# Patient Record
Sex: FEMALE | Race: BLACK OR AFRICAN AMERICAN | NOT HISPANIC OR LATINO | Employment: OTHER | URBAN - METROPOLITAN AREA
[De-identification: names, ages, dates, MRNs, and addresses within clinical notes are randomized per-mention and may not be internally consistent; named-entity substitution may affect disease eponyms.]

---

## 2023-11-19 ENCOUNTER — HOSPITAL ENCOUNTER (OUTPATIENT)
Facility: HOSPITAL | Age: 73
Setting detail: OBSERVATION
Discharge: LEFT BEFORE DISCHARGE | End: 2023-11-20
Attending: EMERGENCY MEDICINE | Admitting: NURSE PRACTITIONER
Payer: COMMERCIAL

## 2023-11-19 ENCOUNTER — APPOINTMENT (OUTPATIENT)
Dept: RADIOLOGY | Facility: HOSPITAL | Age: 73
End: 2023-11-19
Payer: COMMERCIAL

## 2023-11-19 DIAGNOSIS — J44.1 COPD EXACERBATION (MULTI): Primary | ICD-10-CM

## 2023-11-19 LAB
ALBUMIN SERPL BCP-MCNC: 3.8 G/DL (ref 3.4–5)
ALP SERPL-CCNC: 122 U/L (ref 33–136)
ALT SERPL W P-5'-P-CCNC: 8 U/L (ref 7–45)
ANION GAP BLDV CALCULATED.4IONS-SCNC: 13 MMOL/L (ref 10–25)
ANION GAP SERPL CALC-SCNC: 17 MMOL/L (ref 10–20)
AST SERPL W P-5'-P-CCNC: 19 U/L (ref 9–39)
BASE EXCESS BLDV CALC-SCNC: 3.7 MMOL/L (ref -2–3)
BASOPHILS # BLD AUTO: 0.05 X10*3/UL (ref 0–0.1)
BASOPHILS NFR BLD AUTO: 0.5 %
BILIRUB SERPL-MCNC: 0.4 MG/DL (ref 0–1.2)
BODY TEMPERATURE: 37 DEGREES CELSIUS
BUN SERPL-MCNC: 16 MG/DL (ref 6–23)
CA-I BLDV-SCNC: 1.17 MMOL/L (ref 1.1–1.33)
CALCIUM SERPL-MCNC: 9.5 MG/DL (ref 8.6–10.6)
CARDIAC TROPONIN I PNL SERPL HS: 15 NG/L (ref 0–34)
CHLORIDE BLDV-SCNC: 100 MMOL/L (ref 98–107)
CHLORIDE SERPL-SCNC: 102 MMOL/L (ref 98–107)
CO2 SERPL-SCNC: 25 MMOL/L (ref 21–32)
CREAT SERPL-MCNC: 0.48 MG/DL (ref 0.5–1.05)
EOSINOPHIL # BLD AUTO: 0.45 X10*3/UL (ref 0–0.4)
EOSINOPHIL NFR BLD AUTO: 4.6 %
ERYTHROCYTE [DISTWIDTH] IN BLOOD BY AUTOMATED COUNT: 14.6 % (ref 11.5–14.5)
FLUAV RNA RESP QL NAA+PROBE: NOT DETECTED
FLUBV RNA RESP QL NAA+PROBE: NOT DETECTED
GFR SERPL CREATININE-BSD FRML MDRD: >90 ML/MIN/1.73M*2
GLUCOSE BLDV-MCNC: 106 MG/DL (ref 74–99)
GLUCOSE SERPL-MCNC: 96 MG/DL (ref 74–99)
HCO3 BLDV-SCNC: 27.7 MMOL/L (ref 22–26)
HCT VFR BLD AUTO: 46.8 % (ref 36–46)
HCT VFR BLD EST: 48 % (ref 36–46)
HGB BLD-MCNC: 15.2 G/DL (ref 12–16)
HGB BLDV-MCNC: 15.9 G/DL (ref 12–16)
IMM GRANULOCYTES # BLD AUTO: 0.03 X10*3/UL (ref 0–0.5)
IMM GRANULOCYTES NFR BLD AUTO: 0.3 % (ref 0–0.9)
LACTATE BLDV-SCNC: 1.1 MMOL/L (ref 0.4–2)
LYMPHOCYTES # BLD AUTO: 1.43 X10*3/UL (ref 0.8–3)
LYMPHOCYTES NFR BLD AUTO: 14.8 %
MCH RBC QN AUTO: 29.2 PG (ref 26–34)
MCHC RBC AUTO-ENTMCNC: 32.5 G/DL (ref 32–36)
MCV RBC AUTO: 90 FL (ref 80–100)
MONOCYTES # BLD AUTO: 0.84 X10*3/UL (ref 0.05–0.8)
MONOCYTES NFR BLD AUTO: 8.7 %
NEUTROPHILS # BLD AUTO: 6.88 X10*3/UL (ref 1.6–5.5)
NEUTROPHILS NFR BLD AUTO: 71.1 %
NRBC BLD-RTO: 0 /100 WBCS (ref 0–0)
OXYHGB MFR BLDV: 88.2 % (ref 45–75)
PCO2 BLDV: 39 MM HG (ref 41–51)
PH BLDV: 7.46 PH (ref 7.33–7.43)
PLATELET # BLD AUTO: 309 X10*3/UL (ref 150–450)
PO2 BLDV: 63 MM HG (ref 35–45)
POTASSIUM BLDV-SCNC: 4.1 MMOL/L (ref 3.5–5.3)
POTASSIUM SERPL-SCNC: 3.9 MMOL/L (ref 3.5–5.3)
PROT SERPL-MCNC: 7.2 G/DL (ref 6.4–8.2)
RBC # BLD AUTO: 5.21 X10*6/UL (ref 4–5.2)
SAO2 % BLDV: 91 % (ref 45–75)
SARS-COV-2 RNA RESP QL NAA+PROBE: NOT DETECTED
SODIUM BLDV-SCNC: 137 MMOL/L (ref 136–145)
SODIUM SERPL-SCNC: 140 MMOL/L (ref 136–145)
WBC # BLD AUTO: 9.7 X10*3/UL (ref 4.4–11.3)

## 2023-11-19 PROCEDURE — 83880 ASSAY OF NATRIURETIC PEPTIDE: CPT | Performed by: NURSE PRACTITIONER

## 2023-11-19 PROCEDURE — 84484 ASSAY OF TROPONIN QUANT: CPT | Performed by: EMERGENCY MEDICINE

## 2023-11-19 PROCEDURE — 85652 RBC SED RATE AUTOMATED: CPT | Performed by: NURSE PRACTITIONER

## 2023-11-19 PROCEDURE — 93010 ELECTROCARDIOGRAM REPORT: CPT | Performed by: EMERGENCY MEDICINE

## 2023-11-19 PROCEDURE — 71045 X-RAY EXAM CHEST 1 VIEW: CPT | Mod: FY

## 2023-11-19 PROCEDURE — 82330 ASSAY OF CALCIUM: CPT

## 2023-11-19 PROCEDURE — 93005 ELECTROCARDIOGRAM TRACING: CPT

## 2023-11-19 PROCEDURE — 86140 C-REACTIVE PROTEIN: CPT | Performed by: NURSE PRACTITIONER

## 2023-11-19 PROCEDURE — 94640 AIRWAY INHALATION TREATMENT: CPT

## 2023-11-19 PROCEDURE — 85025 COMPLETE CBC W/AUTO DIFF WBC: CPT | Performed by: EMERGENCY MEDICINE

## 2023-11-19 PROCEDURE — 82435 ASSAY OF BLOOD CHLORIDE: CPT | Performed by: EMERGENCY MEDICINE

## 2023-11-19 PROCEDURE — 99285 EMERGENCY DEPT VISIT HI MDM: CPT | Mod: 25 | Performed by: EMERGENCY MEDICINE

## 2023-11-19 PROCEDURE — 99285 EMERGENCY DEPT VISIT HI MDM: CPT | Performed by: EMERGENCY MEDICINE

## 2023-11-19 PROCEDURE — 71045 X-RAY EXAM CHEST 1 VIEW: CPT | Performed by: RADIOLOGY

## 2023-11-19 PROCEDURE — 36415 COLL VENOUS BLD VENIPUNCTURE: CPT | Performed by: EMERGENCY MEDICINE

## 2023-11-19 PROCEDURE — 87636 SARSCOV2 & INF A&B AMP PRB: CPT | Performed by: EMERGENCY MEDICINE

## 2023-11-19 PROCEDURE — 83605 ASSAY OF LACTIC ACID: CPT

## 2023-11-19 PROCEDURE — 2500000002 HC RX 250 W HCPCS SELF ADMINISTERED DRUGS (ALT 637 FOR MEDICARE OP, ALT 636 FOR OP/ED)

## 2023-11-19 RX ORDER — FLUTICASONE PROPIONATE 50 MCG
1 SPRAY, SUSPENSION (ML) NASAL ONCE
Status: COMPLETED | OUTPATIENT
Start: 2023-11-19 | End: 2023-11-19

## 2023-11-19 RX ORDER — ACETAMINOPHEN 325 MG/1
650 TABLET ORAL ONCE
Status: COMPLETED | OUTPATIENT
Start: 2023-11-19 | End: 2023-11-19

## 2023-11-19 RX ORDER — FLUTICASONE PROPIONATE 50 MCG
2 SPRAY, SUSPENSION (ML) NASAL DAILY
Status: DISCONTINUED | OUTPATIENT
Start: 2023-11-20 | End: 2023-11-19

## 2023-11-19 RX ORDER — IPRATROPIUM BROMIDE AND ALBUTEROL SULFATE 2.5; .5 MG/3ML; MG/3ML
3 SOLUTION RESPIRATORY (INHALATION)
Status: DISCONTINUED | OUTPATIENT
Start: 2023-11-19 | End: 2023-11-19

## 2023-11-19 RX ORDER — IPRATROPIUM BROMIDE AND ALBUTEROL SULFATE 2.5; .5 MG/3ML; MG/3ML
3 SOLUTION RESPIRATORY (INHALATION)
Status: DISCONTINUED | OUTPATIENT
Start: 2023-11-19 | End: 2023-11-20

## 2023-11-19 RX ADMIN — FLUTICASONE PROPIONATE 1 SPRAY: 50 SPRAY, METERED NASAL at 23:45

## 2023-11-19 RX ADMIN — IPRATROPIUM BROMIDE AND ALBUTEROL SULFATE 3 ML: .5; 3 SOLUTION RESPIRATORY (INHALATION) at 20:20

## 2023-11-19 RX ADMIN — IPRATROPIUM BROMIDE AND ALBUTEROL SULFATE 3 ML: .5; 3 SOLUTION RESPIRATORY (INHALATION) at 22:17

## 2023-11-19 RX ADMIN — ACETAMINOPHEN 650 MG: 325 TABLET ORAL at 23:45

## 2023-11-19 ASSESSMENT — COLUMBIA-SUICIDE SEVERITY RATING SCALE - C-SSRS
2. HAVE YOU ACTUALLY HAD ANY THOUGHTS OF KILLING YOURSELF?: NO
1. IN THE PAST MONTH, HAVE YOU WISHED YOU WERE DEAD OR WISHED YOU COULD GO TO SLEEP AND NOT WAKE UP?: NO
6. HAVE YOU EVER DONE ANYTHING, STARTED TO DO ANYTHING, OR PREPARED TO DO ANYTHING TO END YOUR LIFE?: NO

## 2023-11-20 ENCOUNTER — PHARMACY VISIT (OUTPATIENT)
Dept: PHARMACY | Facility: CLINIC | Age: 73
End: 2023-11-20
Payer: MEDICARE

## 2023-11-20 VITALS
DIASTOLIC BLOOD PRESSURE: 82 MMHG | WEIGHT: 140 LBS | HEIGHT: 64 IN | BODY MASS INDEX: 23.9 KG/M2 | RESPIRATION RATE: 20 BRPM | TEMPERATURE: 98.4 F | OXYGEN SATURATION: 91 % | HEART RATE: 78 BPM | SYSTOLIC BLOOD PRESSURE: 120 MMHG

## 2023-11-20 PROBLEM — M81.0 OSTEOPOROSIS: Status: ACTIVE | Noted: 2023-11-20

## 2023-11-20 PROBLEM — J44.1 COPD EXACERBATION (MULTI): Status: ACTIVE | Noted: 2023-11-20

## 2023-11-20 PROBLEM — I26.99 OTHER PULMONARY EMBOLISM WITHOUT ACUTE COR PULMONALE (MULTI): Status: ACTIVE | Noted: 2021-12-07

## 2023-11-20 PROBLEM — M48.02 STENOSIS, CERVICAL SPINE: Status: ACTIVE | Noted: 2023-11-20

## 2023-11-20 PROBLEM — J84.10 PULMONARY FIBROSIS, UNSPECIFIED (MULTI): Status: ACTIVE | Noted: 2021-12-07

## 2023-11-20 PROBLEM — I10 ESSENTIAL (PRIMARY) HYPERTENSION: Status: ACTIVE | Noted: 2021-12-07

## 2023-11-20 PROBLEM — M51.06 INTERVERTEBRAL DISC DISORDERS WITH MYELOPATHY, LUMBAR REGION: Status: ACTIVE | Noted: 2021-12-07

## 2023-11-20 PROBLEM — M17.12 PRIMARY OSTEOARTHRITIS OF LEFT KNEE: Status: ACTIVE | Noted: 2023-11-20

## 2023-11-20 PROBLEM — M54.16 LUMBAR RADICULOPATHY: Status: ACTIVE | Noted: 2021-12-07

## 2023-11-20 PROBLEM — K21.9 GASTRO-ESOPHAGEAL REFLUX DISEASE WITHOUT ESOPHAGITIS: Status: ACTIVE | Noted: 2021-12-07

## 2023-11-20 PROBLEM — M48.08: Status: ACTIVE | Noted: 2021-12-07

## 2023-11-20 LAB
ANION GAP SERPL CALC-SCNC: 12 MMOL/L (ref 10–20)
BNP SERPL-MCNC: 87 PG/ML (ref 0–99)
BUN SERPL-MCNC: 12 MG/DL (ref 6–23)
CALCIUM SERPL-MCNC: 9.1 MG/DL (ref 8.6–10.6)
CHLORIDE SERPL-SCNC: 102 MMOL/L (ref 98–107)
CO2 SERPL-SCNC: 31 MMOL/L (ref 21–32)
CREAT SERPL-MCNC: 0.47 MG/DL (ref 0.5–1.05)
CRP SERPL-MCNC: 11.52 MG/DL
D DIMER PPP FEU-MCNC: 343 NG/ML FEU
ERYTHROCYTE [DISTWIDTH] IN BLOOD BY AUTOMATED COUNT: 14.6 % (ref 11.5–14.5)
ERYTHROCYTE [SEDIMENTATION RATE] IN BLOOD BY WESTERGREN METHOD: 43 MM/H (ref 0–30)
GFR SERPL CREATININE-BSD FRML MDRD: >90 ML/MIN/1.73M*2
GLUCOSE SERPL-MCNC: 99 MG/DL (ref 74–99)
HCT VFR BLD AUTO: 43.8 % (ref 36–46)
HGB BLD-MCNC: 14.1 G/DL (ref 12–16)
MAGNESIUM SERPL-MCNC: 1.51 MG/DL (ref 1.6–2.4)
MCH RBC QN AUTO: 29 PG (ref 26–34)
MCHC RBC AUTO-ENTMCNC: 32.2 G/DL (ref 32–36)
MCV RBC AUTO: 90 FL (ref 80–100)
NRBC BLD-RTO: 0 /100 WBCS (ref 0–0)
PLATELET # BLD AUTO: 279 X10*3/UL (ref 150–450)
POTASSIUM SERPL-SCNC: 3.4 MMOL/L (ref 3.5–5.3)
PROCALCITONIN SERPL-MCNC: 0.28 NG/ML
RBC # BLD AUTO: 4.86 X10*6/UL (ref 4–5.2)
SODIUM SERPL-SCNC: 142 MMOL/L (ref 136–145)
WBC # BLD AUTO: 8.6 X10*3/UL (ref 4.4–11.3)

## 2023-11-20 PROCEDURE — 2500000001 HC RX 250 WO HCPCS SELF ADMINISTERED DRUGS (ALT 637 FOR MEDICARE OP): Performed by: NURSE PRACTITIONER

## 2023-11-20 PROCEDURE — 85379 FIBRIN DEGRADATION QUANT: CPT | Performed by: NURSE PRACTITIONER

## 2023-11-20 PROCEDURE — 36415 COLL VENOUS BLD VENIPUNCTURE: CPT | Performed by: NURSE PRACTITIONER

## 2023-11-20 PROCEDURE — 96365 THER/PROPH/DIAG IV INF INIT: CPT | Performed by: EMERGENCY MEDICINE

## 2023-11-20 PROCEDURE — 96376 TX/PRO/DX INJ SAME DRUG ADON: CPT | Performed by: EMERGENCY MEDICINE

## 2023-11-20 PROCEDURE — 99236 HOSP IP/OBS SAME DATE HI 85: CPT | Performed by: STUDENT IN AN ORGANIZED HEALTH CARE EDUCATION/TRAINING PROGRAM

## 2023-11-20 PROCEDURE — G0378 HOSPITAL OBSERVATION PER HR: HCPCS

## 2023-11-20 PROCEDURE — 2500000004 HC RX 250 GENERAL PHARMACY W/ HCPCS (ALT 636 FOR OP/ED): Performed by: NURSE PRACTITIONER

## 2023-11-20 PROCEDURE — 83735 ASSAY OF MAGNESIUM: CPT | Performed by: NURSE PRACTITIONER

## 2023-11-20 PROCEDURE — 96375 TX/PRO/DX INJ NEW DRUG ADDON: CPT | Performed by: EMERGENCY MEDICINE

## 2023-11-20 PROCEDURE — 84145 PROCALCITONIN (PCT): CPT | Performed by: NURSE PRACTITIONER

## 2023-11-20 PROCEDURE — 2500000002 HC RX 250 W HCPCS SELF ADMINISTERED DRUGS (ALT 637 FOR MEDICARE OP, ALT 636 FOR OP/ED): Performed by: NURSE PRACTITIONER

## 2023-11-20 PROCEDURE — 80048 BASIC METABOLIC PNL TOTAL CA: CPT | Performed by: NURSE PRACTITIONER

## 2023-11-20 PROCEDURE — 85027 COMPLETE CBC AUTOMATED: CPT | Performed by: NURSE PRACTITIONER

## 2023-11-20 PROCEDURE — 99223 1ST HOSP IP/OBS HIGH 75: CPT | Performed by: NURSE PRACTITIONER

## 2023-11-20 PROCEDURE — 2500000004 HC RX 250 GENERAL PHARMACY W/ HCPCS (ALT 636 FOR OP/ED)

## 2023-11-20 RX ORDER — ALBUTEROL SULFATE 90 UG/1
2 AEROSOL, METERED RESPIRATORY (INHALATION) EVERY 4 HOURS PRN
Status: DISCONTINUED | OUTPATIENT
Start: 2023-11-20 | End: 2023-11-20 | Stop reason: HOSPADM

## 2023-11-20 RX ORDER — ACETAMINOPHEN 325 MG/1
650 TABLET ORAL EVERY 4 HOURS PRN
Status: DISCONTINUED | OUTPATIENT
Start: 2023-11-20 | End: 2023-11-20 | Stop reason: HOSPADM

## 2023-11-20 RX ORDER — LORATADINE 10 MG/1
10 TABLET ORAL DAILY PRN
COMMUNITY
Start: 2017-04-07

## 2023-11-20 RX ORDER — FLUTICASONE FUROATE AND VILANTEROL 200; 25 UG/1; UG/1
1 POWDER RESPIRATORY (INHALATION)
Status: DISCONTINUED | OUTPATIENT
Start: 2023-11-20 | End: 2023-11-20

## 2023-11-20 RX ORDER — GUAIFENESIN 600 MG/1
1200 TABLET, EXTENDED RELEASE ORAL 2 TIMES DAILY
Status: DISCONTINUED | OUTPATIENT
Start: 2023-11-20 | End: 2023-11-20 | Stop reason: HOSPADM

## 2023-11-20 RX ORDER — IPRATROPIUM BROMIDE AND ALBUTEROL SULFATE 2.5; .5 MG/3ML; MG/3ML
3 SOLUTION RESPIRATORY (INHALATION) EVERY 4 HOURS
Status: DISCONTINUED | OUTPATIENT
Start: 2023-11-20 | End: 2023-11-20 | Stop reason: HOSPADM

## 2023-11-20 RX ORDER — ACETAMINOPHEN 500 MG
5 TABLET ORAL NIGHTLY PRN
Status: DISCONTINUED | OUTPATIENT
Start: 2023-11-20 | End: 2023-11-20 | Stop reason: HOSPADM

## 2023-11-20 RX ORDER — ALBUTEROL SULFATE 90 UG/1
2 AEROSOL, METERED RESPIRATORY (INHALATION) EVERY 4 HOURS PRN
Qty: 18 G | Refills: 0 | Status: SHIPPED | OUTPATIENT
Start: 2023-11-20 | End: 2023-12-03 | Stop reason: ENTERED-IN-ERROR

## 2023-11-20 RX ORDER — POLYETHYLENE GLYCOL 3350 17 G/17G
17 POWDER, FOR SOLUTION ORAL DAILY PRN
Status: DISCONTINUED | OUTPATIENT
Start: 2023-11-20 | End: 2023-11-20 | Stop reason: HOSPADM

## 2023-11-20 RX ORDER — AZITHROMYCIN 500 MG/1
500 TABLET, FILM COATED ORAL DAILY
Qty: 4 TABLET | Refills: 0 | Status: SHIPPED | OUTPATIENT
Start: 2023-11-20 | End: 2023-11-24

## 2023-11-20 RX ORDER — FLUTICASONE PROPIONATE 50 MCG
2 SPRAY, SUSPENSION (ML) NASAL DAILY
Status: DISCONTINUED | OUTPATIENT
Start: 2023-11-20 | End: 2023-11-20 | Stop reason: HOSPADM

## 2023-11-20 RX ORDER — PREDNISONE 20 MG/1
40 TABLET ORAL DAILY
Qty: 8 TABLET | Refills: 0 | Status: SHIPPED | OUTPATIENT
Start: 2023-11-20 | End: 2023-11-24

## 2023-11-20 RX ADMIN — TIOTROPIUM BROMIDE INHALATION SPRAY 2 PUFF: 3.12 SPRAY, METERED RESPIRATORY (INHALATION) at 08:36

## 2023-11-20 RX ADMIN — GUAIFENESIN 1200 MG: 600 TABLET ORAL at 08:36

## 2023-11-20 RX ADMIN — AZITHROMYCIN 500 MG: 500 INJECTION, POWDER, LYOPHILIZED, FOR SOLUTION INTRAVENOUS at 09:43

## 2023-11-20 RX ADMIN — ACETAMINOPHEN 650 MG: 325 TABLET ORAL at 09:44

## 2023-11-20 RX ADMIN — IPRATROPIUM BROMIDE AND ALBUTEROL SULFATE 3 ML: .5; 3 SOLUTION RESPIRATORY (INHALATION) at 06:29

## 2023-11-20 RX ADMIN — METHYLPREDNISOLONE SODIUM SUCCINATE 40 MG: 125 INJECTION, POWDER, FOR SOLUTION INTRAMUSCULAR; INTRAVENOUS at 02:25

## 2023-11-20 RX ADMIN — METHYLPREDNISOLONE SODIUM SUCCINATE 40 MG: 125 INJECTION, POWDER, FOR SOLUTION INTRAMUSCULAR; INTRAVENOUS at 08:36

## 2023-11-20 RX ADMIN — IPRATROPIUM BROMIDE AND ALBUTEROL SULFATE 3 ML: .5; 3 SOLUTION RESPIRATORY (INHALATION) at 02:24

## 2023-11-20 RX ADMIN — FLUTICASONE PROPIONATE 2 SPRAY: 50 SPRAY, METERED NASAL at 08:36

## 2023-11-20 RX ADMIN — Medication 5 MG: at 03:26

## 2023-11-20 ASSESSMENT — PAIN SCALES - GENERAL
PAINLEVEL_OUTOF10: 2
PAINLEVEL_OUTOF10: 3

## 2023-11-20 ASSESSMENT — ENCOUNTER SYMPTOMS
NAUSEA: 0
DIARRHEA: 0
ACTIVITY CHANGE: 0
LIGHT-HEADEDNESS: 0
DIFFICULTY URINATING: 0
DIAPHORESIS: 0
FEVER: 0
DYSURIA: 0
COUGH: 1
DIZZINESS: 0
EYES NEGATIVE: 1
HEADACHES: 0
ENDOCRINE NEGATIVE: 1
SORE THROAT: 0
VOMITING: 0
FLANK PAIN: 0
CONFUSION: 0
CONSTIPATION: 0
PALPITATIONS: 0
MUSCULOSKELETAL NEGATIVE: 1
AGITATION: 0
FREQUENCY: 0
FATIGUE: 0
RHINORRHEA: 0
ABDOMINAL PAIN: 0
CHILLS: 0
NUMBNESS: 0
SHORTNESS OF BREATH: 1
NERVOUS/ANXIOUS: 0
WHEEZING: 1

## 2023-11-20 ASSESSMENT — PAIN - FUNCTIONAL ASSESSMENT
PAIN_FUNCTIONAL_ASSESSMENT: 0-10

## 2023-11-20 ASSESSMENT — PAIN DESCRIPTION - LOCATION: LOCATION: HEAD

## 2023-11-20 NOTE — DISCHARGE SUMMARY
Date of Admission: 11/19/2023    Date of Discharge: 11/20/2023    Discharge Diagnosis  COPD exacerbation (CMS/HCC)    Issues Requiring Follow-Up  Underlying lung disease, needs work up, concern for ILD on imaging  Patient is not optimized and left AGAINST MEDICAL ADVICE.  Needs follow-up as soon as possible for evaluation.  She was prescribed her treatment to continue her management of her exacerbation.  Advised to come back if she changes her mind or to seek medical attention if new concerns arise.  She is active with home health care, did not want to discuss additional options, would recommend discussing adding COPD care path and medication management if not already present.  Started a controller medicine and rescue medicine, she should have PFTs to assist in the decision to continue her or stop.    Discharge Meds     Your medication list        START taking these medications        Instructions Last Dose Given Next Dose Due   albuterol 90 mcg/actuation inhaler      Inhale 2 puffs every 4 hours if needed for wheezing or shortness of breath.       azithromycin 500 mg tablet  Commonly known as: Zithromax      Take 1 tablet (500 mg) by mouth once daily for 4 days.       predniSONE 20 mg tablet  Commonly known as: Deltasone      Take 2 tablets (40 mg) by mouth once daily for 4 days.       tiotropium 2.5 mcg/actuation inhaler  Commonly known as: Spiriva Respimat  Start taking on: November 21, 2023      Inhale 2 puffs once daily. Do not start before November 21, 2023.              ASK your doctor about these medications        Instructions Last Dose Given Next Dose Due   loratadine 10 mg tablet  Commonly known as: Claritin                     Where to Get Your Medications        These medications were sent to [facility]  [id number], [place] [id number]      Hours: 8AM to 6PM Mon-Fri, 8AM to 4PM Sat, 9AM to 1PM Sun Phone: [phone]   albuterol 90 mcg/actuation inhaler  azithromycin 500  mg tablet  predniSONE 20 mg tablet  tiotropium 2.5 mcg/actuation inhaler         Test Results Pending At Discharge  Pending Labs       No current pending labs.            Hospital Course  Melanie Hodges is a 73 y.o. female who presented to the ED 11/19 for dyspnea, productive cough (green sputum), and congestion.  Symptoms have been progressing since 11/16.  Reports that sitting forward makes it better and lying flat makes it worse.  She had conversational dyspnea could not tolerate eating.  Found to have pulse ox in the 70s, chest x-ray showing no acute infiltrate but some chronic diffuse interstitial changes likely represent chronic fibrotic changes.  D-dimer was negative.  Viral work-up negative.  Patient started on azithromycin, steroids and bronchodilators.  Patient reports she prior to subsequent evaluation patient requested to leave AMA, per nursing she was agitated and wanting to leave soon as possible.  I evaluated the patient and had a full conversation regarding her history, presentation, and treatment options as well as follow-up recommendations and concerns about leaving.  Patient was on 5 L nasal cannula, heart oxygen was removed personally and after 20 minutes was saturating at about 90% on room air.  Counseling was provided about oxygen use, oxygen goals, and potential that she could still be hypoxemic even though she is feeling better.  Patient son was also at bedside.  Patient's son did agree with staying for treatment, however,  Patient was decisional and still electing to return home despite recommendation against.   Education counseling provided.  Discussed that patient will need to make a primary care appointment ASAP, and she will have a referral to pulmonary for pulmonary evaluation and PFTs.  Also prescribed 5-day course of azithromycin and prednisone.  Will prescribe Spiriva controller inhaler and albuterol rescue inhaler.  Education provided on medications.  Informed patient if she changes  her mind that she can return to continue her care at a time.  Informed that she should take medications as prescribed, she should seek medical attention for any new or worsening symptoms or any other concerns that arise.  Patient is active with home health care services, did not want to discuss adding additional services such as a COPD care path.  Recommend discussing with primary care.  After education and counseling and recommending patient remain inpatient, she decided to sign AMA.. Discharge plan of care discussed, education and counseling provided involving active problem care plan, warning signs,  risks/benefits of new medications or medication changes, follow-up care and testing.  Patient advised to follow-up with her primary care as ASAP for continued evaluation and management.  Patient informed of concern for underlying lung disease findings on imaging, need for follow-up and further evaluation.  Informed that she should return if she changes her mind and wants to continue her care.  Informed that she should seek medical attention if there are any new/worsening or change in her condition or new concerning symptoms.  There was verbalized understanding and agreement with discharge care plan.     Pertinent Physical Exam At Time of Discharge  On the day of discharge, the patient reported feeling well and pain was controlled. Vitals and labs were reviewed. Oxygen was 90% on room air after removal of oxygen. She declined to walk.  On exam: No acute distress, interactive, no increased work of breathing, appropriate air movement b/l with some rhonchi, regular rate and rhythm, abdomen soft/nontender, no edema.    Outpatient Follow-Up  No future appointments.    Recommending PCP ASAP  Referred to pulmonary with PFTs    Pt instructed to take all medications as prescribed.  Keep all follow-up appointments.  Contact their primary care physician with any questions or concerns that arise.  Come to the emergency department  with worsening of your symptoms or any other medical emergency.     Time spent >30 minutes on discharge management.    Adam De Leon MD

## 2023-11-20 NOTE — PROGRESS NOTES
Received signout on this patient pending admission for COPD exacerbation.  The patient is requiring 4 to 5 L of oxygen to maintain sats, is mildly wheezy but has no increased work of breathing and is speaking full sentences.  Plan to admit.

## 2023-11-20 NOTE — DISCHARGE INSTRUCTIONS
You were admitted for respiratory failure.  It is suspected that you have underlying lung disease based on your imaging.  This is likely an exacerbation of chronic lung disease.  You required oxygen and were started on antibiotics and steroids along with bronchodilator therapy.  You felt better and wished to be discharged AGAINST MEDICAL ADVICE, we discussed that your oxygen levels are still low and you are not optimized and at risk for decompensation.  We also discussed that you should follow-up with your primary care soon as possible.  You were referred to pulmonary and will need pulmonary function test, please verify scheduling your appointment.  You will be prescribed a course of antibiotic and steroid to complete as well as a rescue and controller inhaler.  This will need to be reevaluated by your outpatient providers.    At this time you are not optimized for discharge but you wish to discharge AGAINST MEDICAL ADVICE.  You may return at any time to continue your care, please seek medical technician if you have any new or worsening symptoms or any other concerns or feel that you would like to continue your care.     Please take all medications as prescribed.  Keep all follow-up appointments.  Contact your primary care physician with any questions or concerns that arise.  Come to the emergency department with worsening of your symptoms, severe chest pain, shortness of breath, or any other medical emergency.

## 2023-11-20 NOTE — ED PROVIDER NOTES
HPI   Chief Complaint   Patient presents with    Shortness of Breath       HPI  Patient is a 73-year-old with past medical history of hypertension, COPD presents with shortness of breath.  Patient said the past 3 days she has been having increased shortness of breath.  Today has been the worst and she is having trouble breathing.  She said the past week and a half she has been producing sputum and coughing.  The phlegm is green.  Patient says she has noticed that leaning forward makes her feel better by laying back makes it worse.  Patient denies any fever, chills, vomiting and headache.  Patient is not on any blood thinners.  Patient said 2 years ago she had COVID and pneumonia which resulted in her having a stroke.  She said it does not feel like when she had pneumonia and COVID but she is very short of breath.  Patient heart rate was 116, was febrile at 100.7 and increase blood pressure of 156/93.                  No data recorded                Patient History   Past Medical History:   Diagnosis Date    Personal history of other endocrine, nutritional and metabolic disease     History of hyperlipidemia     Past Surgical History:   Procedure Laterality Date    CHOLECYSTECTOMY  02/20/2014    Cholecystectomy    OTHER SURGICAL HISTORY  08/01/2019    Cervical vertebral fusion    OTHER SURGICAL HISTORY  08/01/2019    Lumbar laminectomy     No family history on file.  Social History     Tobacco Use    Smoking status: Not on file    Smokeless tobacco: Not on file   Substance Use Topics    Alcohol use: Not on file    Drug use: Not on file       Physical Exam   ED Triage Vitals   Temp Heart Rate Resp BP   11/19/23 1927 11/19/23 1927 11/19/23 1927 11/19/23 1927   38.2 °C (100.7 °F) (!) 116 24 (!) 156/93      SpO2 Temp Source Heart Rate Source Patient Position   11/19/23 1927 11/19/23 1927 -- --   (!) 85 % Temporal        BP Location FiO2 (%)     -- 11/19/23 1930      48 %       Physical Exam  Constitutional:        Appearance: She is well-developed and normal weight.   HENT:      Head: Normocephalic and atraumatic.      Mouth/Throat:      Mouth: Mucous membranes are moist.   Cardiovascular:      Rate and Rhythm: Normal rate and regular rhythm.   Pulmonary:      Effort: Tachypnea present.      Breath sounds: Examination of the right-upper field reveals wheezing. Examination of the left-upper field reveals wheezing. Examination of the right-middle field reveals wheezing. Examination of the left-middle field reveals wheezing. Examination of the right-lower field reveals wheezing. Examination of the left-lower field reveals wheezing. Wheezing present.   Abdominal:      General: Bowel sounds are normal.      Palpations: Abdomen is soft.   Musculoskeletal:         General: Normal range of motion.      Cervical back: Normal range of motion and neck supple.   Skin:     General: Skin is warm.   Neurological:      General: No focal deficit present.      Mental Status: She is alert and oriented to person, place, and time.         ED Course & MDM    Patient is a 73-year-old presenting with shortness of breath.  Since patient has history of COPD, COPD exacerbation was top of my differential.  Chest x-ray was ordered and the chest x-ray was negative for anything acute.  Patient was given DuoNebs and some nasal spray.  The fact that the patient has been producing sputum, also indicates patient has some sort of viral infection that has caused an exacerbation of her COPD.  Therefore patient was tested for COVID and flu.  Both the COVID and flu came back negative.  At this time pulmonary embolism is low on my differential because patient does not have any history of blood clot and does not have the typical comorbidities that puts you at risk for pulmonary embolism.  Patient is to the improvement after the DuoNeb and nasal spray treatment. Even though patient is not expressing shortness of breath after the DuoNeb treatment but without the oxygen  she is starting at 86.  She was put on 4 L of oxygen in which she was satting at 94.  Because of this concern, patient was admitted to internal medicine floor.     Medical Decision Making      Procedure  Procedures     Gopal Wyatt MD  Resident  11/19/23 2337       Gopal Wyatt MD  Resident  11/19/23 2342       Gopal Wyatt MD  Resident  11/20/23 0003

## 2023-11-20 NOTE — HOSPITAL COURSE
Melanie Hodges is a 73 y.o. female who presented to the ED 11/19 for dyspnea, productive cough (green sputum), and congestion.  Symptoms have been progressing since 11/16.  Reports that sitting forward makes it better and lying flat makes it worse.  She had conversational dyspnea could not tolerate eating.  Found to have pulse ox in the 70s, chest x-ray showing no acute infiltrate but some chronic diffuse interstitial changes likely represent chronic fibrotic changes.  D-dimer was negative.  Viral work-up negative.  Patient started on azithromycin, steroids and bronchodilators.  Patient reports she prior to subsequent evaluation patient requested to leave AMA, per nursing she was agitated and wanting to leave soon as possible.  I evaluated the patient and had a full conversation regarding her history, presentation, and treatment options as well as follow-up recommendations and concerns about leaving.  Patient was on 5 L nasal cannula, heart oxygen was removed personally and after 20 minutes was saturating at about 90% on room air.  Counseling was provided about oxygen use, oxygen goals, and potential that she could still be hypoxemic even though she is feeling better.  Patient son was also at bedside.  Patient's son did agree with staying for treatment, however,  Patient was decisional and still electing to return home despite recommendation against.   Education counseling provided.  Discussed that patient will need to make a primary care appointment ASAP, and she will have a referral to pulmonary for pulmonary evaluation and PFTs.  Also prescribed 5-day course of azithromycin and prednisone.  Will prescribe Spiriva controller inhaler and albuterol rescue inhaler.  Education provided on medications.  Informed patient if she changes her mind that she can return to continue her care at a time.  Informed that she should take medications as prescribed, she should seek medical attention for any new or worsening symptoms or  any other concerns that arise.  Patient is active with home health care services, did not want to discuss adding additional services such as a COPD care path.  Recommend discussing with primary care.  After education and counseling and recommending patient remain inpatient, she decided to sign AMA.

## 2023-11-20 NOTE — H&P
"History Of Present Illness  Melanie Hodges is a 73 y.o. female who presented to the ED yesterday (11/19) evening for further evaluation of dyspnea, productive cough (green sputum), and congestion. Pt reports that symptoms started last Thursday (11/16) and got progressively worse per her report. Pt reports that symptoms started with mild SOB at rest and cough. Pt told writer that leaning forward makes dyspnea better and lying flat makes it worse. Pt was unable to eat at home due to dyspnea. Pt stated, \" I couldn't even eat at home because my breathing was so bad. Eating made me feel more short of breath.\" Pt is wheelchair bound at home due to prior back surgeries. Pt did not take any medications at home to help manage her symptoms prior to ED presentation. Pt placed on 4LNC upon arrival to ED due to Pox in high 70's. Pt does not wear oxygen at home. Pt told writer that she does not take any medications at home except for Claritin as needed for allergies. COVID 19, Influenza A and Influenza B PCR negative on admit. CXR showed diffuse interstitial coarsening and mid to upper right left mid lung opacities similar to prior exams which likely represents sequela of chronic parenchymal and fibrotic changes. Labs obtained on admit notable for elevated inflammatory markers. It is possible that COPD exacerbation could be r/t pneumonia or worsening pulmonary fibrosis.  D-Dimer, VTE exclusion pending collection. Consider CT angio of chest for PE if D-dimer is elevated. Pt on 4LNC when seen by writer in the ED. Diffuse expiratory wheezing present on exam. No increased work of breathing observed. Pt able to speak without stopping to catch her breath. Pt told writer that she feels a little better than she did on arrival. Pt lives alone but has home health aide 5 days/wk for 5-6 hrs. ROS negative outside of complaints listed above. Pt will be admitted to medicine service for further treatment of COPD exacerbation.     Past Medical " History  Past Medical History:   Diagnosis Date    Cervical stenosis of spine     COPD (chronic obstructive pulmonary disease) (CMS/[facility])     GERD (gastroesophageal reflux disease)     HLD (hyperlipidemia)     Osteoarthritis     Pulmonary fibrosis (CMS/HCC)     Stroke (CMS/[facility])        Surgical History  Past Surgical History:   Procedure Laterality Date    CHOLECYSTECTOMY  02/20/2014    Cholecystectomy    OTHER SURGICAL HISTORY  08/01/2019    Cervical vertebral fusion    OTHER SURGICAL HISTORY  08/01/2019    Lumbar laminectomy        Social History  She reports that she has quit smoking. Her smoking use included cigarettes. She has never used smokeless tobacco. She reports that she does not currently use alcohol. She reports that she does not use drugs.    Family History  No family history on file.     Allergies  Patient has no known allergies.    Review of Systems   Constitutional:  Negative for activity change, chills, diaphoresis, fatigue and fever.   HENT:  Positive for congestion. Negative for rhinorrhea, sneezing and sore throat.    Eyes: Negative.    Respiratory:  Positive for cough, shortness of breath and wheezing.    Cardiovascular:  Negative for chest pain, palpitations and leg swelling.   Gastrointestinal:  Negative for abdominal pain, constipation, diarrhea, nausea and vomiting.   Endocrine: Negative.    Genitourinary:  Negative for difficulty urinating, dysuria, flank pain, frequency and urgency.   Musculoskeletal: Negative.    Skin: Negative.    Neurological:  Negative for dizziness, syncope, light-headedness, numbness and headaches.   Psychiatric/Behavioral:  Negative for agitation and confusion. The patient is not nervous/anxious.         Physical Exam  Constitutional:       General: She is not in acute distress.     Appearance: Normal appearance. She is not ill-appearing.   HENT:      Head: Normocephalic and atraumatic.      Mouth/Throat:      Mouth: Mucous membranes are moist.   Eyes:       "Extraocular Movements: Extraocular movements intact.      Conjunctiva/sclera: Conjunctivae normal.   Cardiovascular:      Rate and Rhythm: Tachycardia present.      Pulses: Normal pulses.      Heart sounds: Normal heart sounds.   Pulmonary:      Effort: Pulmonary effort is normal. No respiratory distress.      Comments: Diffuse wheezing heard throughout lungs  Abdominal:      General: Bowel sounds are normal.      Palpations: Abdomen is soft.   Musculoskeletal:         General: Normal range of motion.      Cervical back: Normal range of motion and neck supple.   Skin:     General: Skin is warm and dry.   Neurological:      Mental Status: She is alert and oriented to person, place, and time. Mental status is at baseline.   Psychiatric:         Mood and Affect: Mood normal.         Behavior: Behavior normal.          Last Recorded Vitals  Blood pressure 117/85, pulse 104, temperature 38.2 °C (100.7 °F), temperature source Temporal, resp. rate 17, height 1.626 m (5' 4\"), weight 63.5 kg (140 lb), SpO2 90 %.    Relevant Results  Scheduled medications  azithromycin, 500 mg, intravenous, Daily  fluticasone, 2 spray, Each Nostril, Daily  guaiFENesin, 1,200 mg, oral, BID  ipratropium-albuteroL, 3 mL, nebulization, q4h  methylPREDNISolone sodium succinate (PF), 40 mg, intravenous, q8h  tiotropium, 2 Inhalation, inhalation, Daily      Continuous medications     PRN medications  PRN medications: acetaminophen, albuterol, melatonin, polyethylene glycol    Results for orders placed or performed during the hospital encounter of 11/19/23 (from the past 24 hour(s))   Blood Gas Venous Full Panel Unsolicited   Result Value Ref Range    POCT pH, Venous 7.46 (H) 7.33 - 7.43 pH    POCT pCO2, Venous 39 (L) 41 - 51 mm Hg    POCT pO2, Venous 63 (H) 35 - 45 mm Hg    POCT SO2, Venous 91 (H) 45 - 75 %    POCT Oxy Hemoglobin, Venous 88.2 (H) 45.0 - 75.0 %    POCT Hematocrit Calculated, Venous 48.0 (H) 36.0 - 46.0 %    POCT Sodium, Venous 137 " 136 - 145 mmol/L    POCT Potassium, Venous 4.1 3.5 - 5.3 mmol/L    POCT Chloride, Venous 100 98 - 107 mmol/L    POCT Ionized Calicum, Venous 1.17 1.10 - 1.33 mmol/L    POCT Glucose, Venous 106 (H) 74 - 99 mg/dL    POCT Lactate, Venous 1.1 0.4 - 2.0 mmol/L    POCT Base Excess, Venous 3.7 (H) -2.0 - 3.0 mmol/L    POCT HCO3 Calculated, Venous 27.7 (H) 22.0 - 26.0 mmol/L    POCT Hemoglobin, Venous 15.9 12.0 - 16.0 g/dL    POCT Anion Gap, Venous 13.0 10.0 - 25.0 mmol/L    Patient Temperature 37.0 degrees Celsius   CBC with Differential   Result Value Ref Range    WBC 9.7 4.4 - 11.3 x10*3/uL    nRBC 0.0 0.0 - 0.0 /100 WBCs    RBC 5.21 (H) 4.00 - 5.20 x10*6/uL    Hemoglobin 15.2 12.0 - 16.0 g/dL    Hematocrit 46.8 (H) 36.0 - 46.0 %    MCV 90 80 - 100 fL    MCH 29.2 26.0 - 34.0 pg    MCHC 32.5 32.0 - 36.0 g/dL    RDW 14.6 (H) 11.5 - 14.5 %    Platelets 309 150 - 450 x10*3/uL    Neutrophils % 71.1 40.0 - 80.0 %    Immature Granulocytes %, Automated 0.3 0.0 - 0.9 %    Lymphocytes % 14.8 13.0 - 44.0 %    Monocytes % 8.7 2.0 - 10.0 %    Eosinophils % 4.6 0.0 - 6.0 %    Basophils % 0.5 0.0 - 2.0 %    Neutrophils Absolute 6.88 (H) 1.60 - 5.50 x10*3/uL    Immature Granulocytes Absolute, Automated 0.03 0.00 - 0.50 x10*3/uL    Lymphocytes Absolute 1.43 0.80 - 3.00 x10*3/uL    Monocytes Absolute 0.84 (H) 0.05 - 0.80 x10*3/uL    Eosinophils Absolute 0.45 (H) 0.00 - 0.40 x10*3/uL    Basophils Absolute 0.05 0.00 - 0.10 x10*3/uL   Comprehensive Metabolic Panel   Result Value Ref Range    Glucose 96 74 - 99 mg/dL    Sodium 140 136 - 145 mmol/L    Potassium 3.9 3.5 - 5.3 mmol/L    Chloride 102 98 - 107 mmol/L    Bicarbonate 25 21 - 32 mmol/L    Anion Gap 17 10 - 20 mmol/L    Urea Nitrogen 16 6 - 23 mg/dL    Creatinine 0.48 (L) 0.50 - 1.05 mg/dL    eGFR >90 >60 mL/min/1.73m*2    Calcium 9.5 8.6 - 10.6 mg/dL    Albumin 3.8 3.4 - 5.0 g/dL    Alkaline Phosphatase 122 33 - 136 U/L    Total Protein 7.2 6.4 - 8.2 g/dL    AST 19 9 - 39 U/L     Bilirubin, Total 0.4 0.0 - 1.2 mg/dL    ALT 8 7 - 45 U/L   Troponin I, High Sensitivity   Result Value Ref Range    Troponin I, High Sensitivity 15 0 - 34 ng/L   Sedimentation rate, automated   Result Value Ref Range    Sedimentation Rate 43 (H) 0 - 30 mm/h   C-reactive protein   Result Value Ref Range    C-Reactive Protein 11.52 (H) <1.00 mg/dL   Sars-CoV-2 and Influenza A/B PCR   Result Value Ref Range    Flu A Result Not Detected Not Detected    Flu B Result Not Detected Not Detected    Coronavirus 2019, PCR Not Detected Not Detected      XR chest 1 view    Result Date: 11/19/2023  Interpreted By:  Adam Odell and Liller Gregory STUDY: XR CHEST 1 VIEW;  11/19/2023 8:25 pm   INDICATION: Signs/Symptoms:SOB.   COMPARISON: Chest radiograph 03/23/2023   ACCESSION NUMBER(S): [id number]   ORDERING CLINICIAN: MONTSE MANZANO   FINDINGS: AP radiograph of the chest was provided.   CARDIOMEDIASTINAL SILHOUETTE: Cardiomediastinal silhouette is stable in size and configuration.   LUNGS: Redemonstration of diffuse coarsened interstitial markings with interspersed lucencies most predominant within the right upper lung favored to represent sequela of chronic lung disease There is no new focal consolidation, pleural effusion, or pneumothorax. However, superimposed infiltrates is not excluded.   ABDOMEN: No remarkable upper abdominal findings.   BONES: No acute osseous injury is evident.       Diffuse interstitial coarsening and mid to upper right left mid lung opacities similar to prior exams. These likely represent sequela of chronic parenchymal and fibrotic changes. Superimposed airspace disease is difficult to exclude.   I personally reviewed the images/study and I agree with the findings as stated above by resident physician, Dr. Sebastián Erwin.   Signed by: Adam Odell 11/19/2023 8:38 PM Dictation workstation:   [id number]        Assessment/Plan   #COPD exacerbation  #elevated inflammatory markers c/f  pneumonia  - Respiratory regimen: Duoneb Q4hr scheduled, Albuterol MDI Q4hr as needed for dyspnea, Solumedrol 40 mg IV Q8hr, Spiriva Respimat 2 inhalation every day, Flonase nasal spray 2 sprays every day, Mucinex 1200 mg PO BID   - pt currently on 4LNC , Supplemental oxygen as needed- Wean as tolerated, continuous Pox ordered,  - Antibiotic regimen: Azithromycin 500mg IV/PO QD x 3 days (11/20-)  - Encourage Incentive Spirometry use   -No prior PFTs found in pt chart  - pt does not follow with pulmonology outpatient  - schedule PFT and outpatient follow-up appt on discharge with pulmonology  - no home meds prescribed  - ESR 43 and CRP 11.52 on admit, Procalcitonin and urine pneumonia antigens pending collection        Dispo: admit to RNF. Plan per above       I spent 90 minutes in the professional and overall care of this patient.      Luz Maria Beltre, APRN-CNP

## 2023-11-20 NOTE — ED NOTES
Dr. De Leon came to see patient, and pt signed AMA papers with him. She does not want to stay regardless of her oxygen level being 80's on room air. Pt understands the complications of leaving and signed AMA. Pts son will take her home.      Juana Bowen RN  11/20/23 0449

## 2023-11-20 NOTE — ED TRIAGE NOTES
Patient reports to ED for c/c of shortness of breath. Patient reports hx of COPD but denies use of inhalers, former smoker.Patient reports productive cough, subjective fever.

## 2023-11-22 ENCOUNTER — CLINICAL SUPPORT (OUTPATIENT)
Dept: EMERGENCY MEDICINE | Facility: HOSPITAL | Age: 73
End: 2023-11-22
Payer: COMMERCIAL

## 2023-11-22 LAB
ATRIAL RATE: 102 BPM
P AXIS: 46 DEGREES
P OFFSET: 193 MS
P ONSET: 146 MS
PR INTERVAL: 138 MS
Q ONSET: 215 MS
QRS COUNT: 17 BEATS
QRS DURATION: 76 MS
QT INTERVAL: 356 MS
QTC CALCULATION(BAZETT): 463 MS
QTC FREDERICIA: 424 MS
R AXIS: -44 DEGREES
T AXIS: 128 DEGREES
T OFFSET: 393 MS
VENTRICULAR RATE: 102 BPM

## 2023-12-03 ENCOUNTER — APPOINTMENT (OUTPATIENT)
Dept: RADIOLOGY | Facility: HOSPITAL | Age: 73
DRG: 189 | End: 2023-12-03
Payer: COMMERCIAL

## 2023-12-03 ENCOUNTER — HOSPITAL ENCOUNTER (INPATIENT)
Facility: HOSPITAL | Age: 73
LOS: 3 days | Discharge: HOME | DRG: 189 | End: 2023-12-06
Attending: EMERGENCY MEDICINE | Admitting: INTERNAL MEDICINE
Payer: COMMERCIAL

## 2023-12-03 DIAGNOSIS — M48.08 SPINAL STENOSIS, SACRAL AND SACROCOCCYGEAL REGION: ICD-10-CM

## 2023-12-03 DIAGNOSIS — J96.01 ACUTE RESPIRATORY FAILURE WITH HYPOXIA (MULTI): ICD-10-CM

## 2023-12-03 DIAGNOSIS — I50.9 CONGESTIVE HEART FAILURE, UNSPECIFIED HF CHRONICITY, UNSPECIFIED HEART FAILURE TYPE (MULTI): ICD-10-CM

## 2023-12-03 DIAGNOSIS — M48.02 STENOSIS, CERVICAL SPINE: ICD-10-CM

## 2023-12-03 DIAGNOSIS — J44.1 COPD EXACERBATION (MULTI): Primary | ICD-10-CM

## 2023-12-03 LAB
ALBUMIN SERPL-MCNC: 3.6 G/DL (ref 3.5–5)
ALP BLD-CCNC: 107 U/L (ref 35–125)
ALT SERPL-CCNC: 6 U/L (ref 5–40)
ANION GAP SERPL CALC-SCNC: 12 MMOL/L
AST SERPL-CCNC: 16 U/L (ref 5–40)
BILIRUB SERPL-MCNC: 0.2 MG/DL (ref 0.1–1.2)
BUN SERPL-MCNC: 18 MG/DL (ref 8–25)
CALCIUM SERPL-MCNC: 8.7 MG/DL (ref 8.5–10.4)
CHLORIDE SERPL-SCNC: 105 MMOL/L (ref 97–107)
CO2 SERPL-SCNC: 27 MMOL/L (ref 24–31)
CREAT SERPL-MCNC: 0.6 MG/DL (ref 0.4–1.6)
ERYTHROCYTE [DISTWIDTH] IN BLOOD BY AUTOMATED COUNT: 14.5 % (ref 11.5–14.5)
FLUAV RNA RESP QL NAA+PROBE: NOT DETECTED
FLUBV RNA RESP QL NAA+PROBE: NOT DETECTED
GFR SERPL CREATININE-BSD FRML MDRD: >90 ML/MIN/1.73M*2
GLUCOSE SERPL-MCNC: 100 MG/DL (ref 65–99)
HCT VFR BLD AUTO: 48.8 % (ref 36–46)
HGB BLD-MCNC: 15.4 G/DL (ref 12–16)
LACTATE BLDV-SCNC: 1.9 MMOL/L (ref 0.4–2)
MCH RBC QN AUTO: 29.2 PG (ref 26–34)
MCHC RBC AUTO-ENTMCNC: 31.6 G/DL (ref 32–36)
MCV RBC AUTO: 92 FL (ref 80–100)
NRBC BLD-RTO: 0 /100 WBCS (ref 0–0)
NT-PROBNP SERPL-MCNC: 2148 PG/ML (ref 0–624)
PLATELET # BLD AUTO: 385 X10*3/UL (ref 150–450)
POTASSIUM SERPL-SCNC: 3.3 MMOL/L (ref 3.4–5.1)
PROT SERPL-MCNC: 7 G/DL (ref 5.9–7.9)
RBC # BLD AUTO: 5.28 X10*6/UL (ref 4–5.2)
SARS-COV-2 RNA RESP QL NAA+PROBE: NOT DETECTED
SODIUM SERPL-SCNC: 144 MMOL/L (ref 133–145)
TROPONIN T SERPL-MCNC: 23 NG/L
TROPONIN T SERPL-MCNC: 26 NG/L
TROPONIN T SERPL-MCNC: 38 NG/L
WBC # BLD AUTO: 8.6 X10*3/UL (ref 4.4–11.3)

## 2023-12-03 PROCEDURE — 2550000001 HC RX 255 CONTRASTS: Performed by: EMERGENCY MEDICINE

## 2023-12-03 PROCEDURE — 87075 CULTR BACTERIA EXCEPT BLOOD: CPT | Mod: WESLAB,91 | Performed by: NURSE PRACTITIONER

## 2023-12-03 PROCEDURE — 2500000004 HC RX 250 GENERAL PHARMACY W/ HCPCS (ALT 636 FOR OP/ED): Performed by: INTERNAL MEDICINE

## 2023-12-03 PROCEDURE — 71275 CT ANGIOGRAPHY CHEST: CPT

## 2023-12-03 PROCEDURE — 84484 ASSAY OF TROPONIN QUANT: CPT | Performed by: NURSE PRACTITIONER

## 2023-12-03 PROCEDURE — 93010 ELECTROCARDIOGRAM REPORT: CPT | Performed by: INTERNAL MEDICINE

## 2023-12-03 PROCEDURE — 87636 SARSCOV2 & INF A&B AMP PRB: CPT | Performed by: NURSE PRACTITIONER

## 2023-12-03 PROCEDURE — 85027 COMPLETE CBC AUTOMATED: CPT | Performed by: NURSE PRACTITIONER

## 2023-12-03 PROCEDURE — 94760 N-INVAS EAR/PLS OXIMETRY 1: CPT

## 2023-12-03 PROCEDURE — 83605 ASSAY OF LACTIC ACID: CPT | Performed by: NURSE PRACTITIONER

## 2023-12-03 PROCEDURE — 2500000004 HC RX 250 GENERAL PHARMACY W/ HCPCS (ALT 636 FOR OP/ED): Performed by: NURSE PRACTITIONER

## 2023-12-03 PROCEDURE — 2500000001 HC RX 250 WO HCPCS SELF ADMINISTERED DRUGS (ALT 637 FOR MEDICARE OP): Performed by: INTERNAL MEDICINE

## 2023-12-03 PROCEDURE — 2060000001 HC INTERMEDIATE ICU ROOM DAILY

## 2023-12-03 PROCEDURE — 83880 ASSAY OF NATRIURETIC PEPTIDE: CPT | Performed by: NURSE PRACTITIONER

## 2023-12-03 PROCEDURE — 71045 X-RAY EXAM CHEST 1 VIEW: CPT

## 2023-12-03 PROCEDURE — 2500000002 HC RX 250 W HCPCS SELF ADMINISTERED DRUGS (ALT 637 FOR MEDICARE OP, ALT 636 FOR OP/ED): Performed by: NURSE PRACTITIONER

## 2023-12-03 PROCEDURE — 87075 CULTR BACTERIA EXCEPT BLOOD: CPT | Mod: WESLAB | Performed by: NURSE PRACTITIONER

## 2023-12-03 PROCEDURE — 36415 COLL VENOUS BLD VENIPUNCTURE: CPT | Performed by: NURSE PRACTITIONER

## 2023-12-03 PROCEDURE — 2500000005 HC RX 250 GENERAL PHARMACY W/O HCPCS: Performed by: EMERGENCY MEDICINE

## 2023-12-03 PROCEDURE — 94660 CPAP INITIATION&MGMT: CPT

## 2023-12-03 PROCEDURE — 80053 COMPREHEN METABOLIC PANEL: CPT | Performed by: NURSE PRACTITIONER

## 2023-12-03 PROCEDURE — 99285 EMERGENCY DEPT VISIT HI MDM: CPT | Performed by: EMERGENCY MEDICINE

## 2023-12-03 RX ORDER — BUDESONIDE 0.5 MG/2ML
0.5 INHALANT ORAL
Status: DISCONTINUED | OUTPATIENT
Start: 2023-12-04 | End: 2023-12-06 | Stop reason: HOSPADM

## 2023-12-03 RX ORDER — LORATADINE 10 MG/1
10 TABLET ORAL DAILY PRN
Status: DISCONTINUED | OUTPATIENT
Start: 2023-12-03 | End: 2023-12-06 | Stop reason: HOSPADM

## 2023-12-03 RX ORDER — IPRATROPIUM BROMIDE AND ALBUTEROL SULFATE 2.5; .5 MG/3ML; MG/3ML
3 SOLUTION RESPIRATORY (INHALATION) EVERY 2 HOUR PRN
Status: DISCONTINUED | OUTPATIENT
Start: 2023-12-03 | End: 2023-12-06 | Stop reason: HOSPADM

## 2023-12-03 RX ORDER — ACETAMINOPHEN 650 MG/1
650 SUPPOSITORY RECTAL EVERY 4 HOURS PRN
Status: DISCONTINUED | OUTPATIENT
Start: 2023-12-03 | End: 2023-12-06 | Stop reason: HOSPADM

## 2023-12-03 RX ORDER — ONDANSETRON HYDROCHLORIDE 2 MG/ML
4 INJECTION, SOLUTION INTRAVENOUS EVERY 8 HOURS PRN
Status: DISCONTINUED | OUTPATIENT
Start: 2023-12-03 | End: 2023-12-06 | Stop reason: HOSPADM

## 2023-12-03 RX ORDER — ONDANSETRON 4 MG/1
4 TABLET, FILM COATED ORAL EVERY 8 HOURS PRN
Status: DISCONTINUED | OUTPATIENT
Start: 2023-12-03 | End: 2023-12-06 | Stop reason: HOSPADM

## 2023-12-03 RX ORDER — ALBUTEROL SULFATE 0.83 MG/ML
5 SOLUTION RESPIRATORY (INHALATION) ONCE
Status: COMPLETED | OUTPATIENT
Start: 2023-12-03 | End: 2023-12-03

## 2023-12-03 RX ORDER — MAGNESIUM SULFATE HEPTAHYDRATE 40 MG/ML
2 INJECTION, SOLUTION INTRAVENOUS ONCE
Status: COMPLETED | OUTPATIENT
Start: 2023-12-03 | End: 2023-12-03

## 2023-12-03 RX ORDER — DOXYCYCLINE 100 MG/1
100 CAPSULE ORAL EVERY 12 HOURS SCHEDULED
Status: DISCONTINUED | OUTPATIENT
Start: 2023-12-03 | End: 2023-12-04

## 2023-12-03 RX ORDER — IPRATROPIUM BROMIDE AND ALBUTEROL SULFATE 2.5; .5 MG/3ML; MG/3ML
3 SOLUTION RESPIRATORY (INHALATION)
Status: DISCONTINUED | OUTPATIENT
Start: 2023-12-04 | End: 2023-12-05

## 2023-12-03 RX ORDER — POTASSIUM CHLORIDE 1.5 G/1.58G
40 POWDER, FOR SOLUTION ORAL ONCE
Status: COMPLETED | OUTPATIENT
Start: 2023-12-03 | End: 2023-12-03

## 2023-12-03 RX ORDER — IPRATROPIUM BROMIDE AND ALBUTEROL SULFATE 2.5; .5 MG/3ML; MG/3ML
3 SOLUTION RESPIRATORY (INHALATION)
Status: DISCONTINUED | OUTPATIENT
Start: 2023-12-04 | End: 2023-12-03

## 2023-12-03 RX ORDER — ASPIRIN 81 MG/1
81 TABLET ORAL DAILY
Status: DISCONTINUED | OUTPATIENT
Start: 2023-12-03 | End: 2023-12-06 | Stop reason: HOSPADM

## 2023-12-03 RX ORDER — POLYETHYLENE GLYCOL 3350 17 G/17G
17 POWDER, FOR SOLUTION ORAL DAILY PRN
Status: DISCONTINUED | OUTPATIENT
Start: 2023-12-03 | End: 2023-12-06 | Stop reason: HOSPADM

## 2023-12-03 RX ORDER — ASPIRIN 81 MG/1
81 TABLET ORAL DAILY
COMMUNITY

## 2023-12-03 RX ORDER — ACETAMINOPHEN 325 MG/1
650 TABLET ORAL EVERY 4 HOURS PRN
Status: DISCONTINUED | OUTPATIENT
Start: 2023-12-03 | End: 2023-12-06 | Stop reason: HOSPADM

## 2023-12-03 RX ORDER — ACETAMINOPHEN 500 MG
5 TABLET ORAL NIGHTLY PRN
Status: DISCONTINUED | OUTPATIENT
Start: 2023-12-03 | End: 2023-12-06 | Stop reason: HOSPADM

## 2023-12-03 RX ORDER — ACETAMINOPHEN 160 MG/5ML
650 SOLUTION ORAL EVERY 4 HOURS PRN
Status: DISCONTINUED | OUTPATIENT
Start: 2023-12-03 | End: 2023-12-06 | Stop reason: HOSPADM

## 2023-12-03 RX ORDER — MECLIZINE HYDROCHLORIDE 25 MG/1
25 TABLET ORAL 3 TIMES DAILY PRN
COMMUNITY

## 2023-12-03 RX ADMIN — PIPERACILLIN SODIUM AND TAZOBACTAM SODIUM 4.5 G: 4; .5 INJECTION, SOLUTION INTRAVENOUS at 11:37

## 2023-12-03 RX ADMIN — ALBUTEROL SULFATE 5 MG: 2.5 SOLUTION RESPIRATORY (INHALATION) at 11:37

## 2023-12-03 RX ADMIN — METHYLPREDNISOLONE SODIUM SUCCINATE 125 MG: 125 INJECTION, POWDER, FOR SOLUTION INTRAMUSCULAR; INTRAVENOUS at 11:54

## 2023-12-03 RX ADMIN — Medication 81 MG: at 20:50

## 2023-12-03 RX ADMIN — DOXYCYCLINE HYCLATE 100 MG: 100 CAPSULE ORAL at 20:50

## 2023-12-03 RX ADMIN — MAGNESIUM SULFATE HEPTAHYDRATE 2 G: 40 INJECTION, SOLUTION INTRAVENOUS at 12:12

## 2023-12-03 RX ADMIN — POTASSIUM CHLORIDE 40 MEQ: 1.5 FOR SOLUTION ORAL at 17:15

## 2023-12-03 RX ADMIN — METHYLPREDNISOLONE SODIUM SUCCINATE 40 MG: 40 INJECTION, POWDER, FOR SOLUTION INTRAMUSCULAR; INTRAVENOUS at 20:50

## 2023-12-03 RX ADMIN — IOHEXOL 75 ML: 350 INJECTION, SOLUTION INTRAVENOUS at 14:57

## 2023-12-03 RX ADMIN — Medication: at 19:11

## 2023-12-03 SDOH — SOCIAL STABILITY: SOCIAL INSECURITY: HAVE YOU HAD THOUGHTS OF HARMING ANYONE ELSE?: NO

## 2023-12-03 SDOH — SOCIAL STABILITY: SOCIAL INSECURITY: ARE THERE ANY APPARENT SIGNS OF INJURIES/BEHAVIORS THAT COULD BE RELATED TO ABUSE/NEGLECT?: NO

## 2023-12-03 SDOH — SOCIAL STABILITY: SOCIAL INSECURITY: WERE YOU ABLE TO COMPLETE ALL THE BEHAVIORAL HEALTH SCREENINGS?: YES

## 2023-12-03 SDOH — SOCIAL STABILITY: SOCIAL INSECURITY: ABUSE: ADULT

## 2023-12-03 SDOH — SOCIAL STABILITY: SOCIAL INSECURITY: ARE YOU OR HAVE YOU BEEN THREATENED OR ABUSED PHYSICALLY, EMOTIONALLY, OR SEXUALLY BY ANYONE?: NO

## 2023-12-03 SDOH — SOCIAL STABILITY: SOCIAL INSECURITY: HAS ANYONE EVER THREATENED TO HURT YOUR FAMILY OR YOUR PETS?: NO

## 2023-12-03 SDOH — HEALTH STABILITY: MENTAL HEALTH: EXPERIENCED ANY OF THE FOLLOWING LIFE EVENTS: DEATH OF FAMILY/FRIEND

## 2023-12-03 SDOH — SOCIAL STABILITY: SOCIAL INSECURITY: DO YOU FEEL ANYONE HAS EXPLOITED OR TAKEN ADVANTAGE OF YOU FINANCIALLY OR OF YOUR PERSONAL PROPERTY?: NO

## 2023-12-03 SDOH — SOCIAL STABILITY: SOCIAL INSECURITY: DOES ANYONE TRY TO KEEP YOU FROM HAVING/CONTACTING OTHER FRIENDS OR DOING THINGS OUTSIDE YOUR HOME?: NO

## 2023-12-03 SDOH — SOCIAL STABILITY: SOCIAL INSECURITY: DO YOU FEEL UNSAFE GOING BACK TO THE PLACE WHERE YOU ARE LIVING?: NO

## 2023-12-03 ASSESSMENT — COGNITIVE AND FUNCTIONAL STATUS - GENERAL
STANDING UP FROM CHAIR USING ARMS: A LOT
MOVING FROM LYING ON BACK TO SITTING ON SIDE OF FLAT BED WITH BEDRAILS: A LITTLE
WALKING IN HOSPITAL ROOM: TOTAL
DRESSING REGULAR UPPER BODY CLOTHING: A LITTLE
DRESSING REGULAR LOWER BODY CLOTHING: A LITTLE
TURNING FROM BACK TO SIDE WHILE IN FLAT BAD: A LITTLE
CLIMB 3 TO 5 STEPS WITH RAILING: TOTAL
PATIENT BASELINE BEDBOUND: NO
MOBILITY SCORE: 12
DAILY ACTIVITIY SCORE: 19
MOVING TO AND FROM BED TO CHAIR: A LOT
TOILETING: A LITTLE
HELP NEEDED FOR BATHING: A LITTLE
PERSONAL GROOMING: A LITTLE

## 2023-12-03 ASSESSMENT — PATIENT HEALTH QUESTIONNAIRE - PHQ9
SUM OF ALL RESPONSES TO PHQ9 QUESTIONS 1 & 2: 0
2. FEELING DOWN, DEPRESSED OR HOPELESS: NOT AT ALL
1. LITTLE INTEREST OR PLEASURE IN DOING THINGS: NOT AT ALL

## 2023-12-03 ASSESSMENT — COLUMBIA-SUICIDE SEVERITY RATING SCALE - C-SSRS
1. IN THE PAST MONTH, HAVE YOU WISHED YOU WERE DEAD OR WISHED YOU COULD GO TO SLEEP AND NOT WAKE UP?: NO
2. HAVE YOU ACTUALLY HAD ANY THOUGHTS OF KILLING YOURSELF?: NO
6. HAVE YOU EVER DONE ANYTHING, STARTED TO DO ANYTHING, OR PREPARED TO DO ANYTHING TO END YOUR LIFE?: NO

## 2023-12-03 ASSESSMENT — ACTIVITIES OF DAILY LIVING (ADL)
WALKS IN HOME: NEEDS ASSISTANCE
HEARING - LEFT EAR: FUNCTIONAL
DRESSING YOURSELF: NEEDS ASSISTANCE
GROOMING: NEEDS ASSISTANCE
HEARING - RIGHT EAR: FUNCTIONAL
ASSISTIVE_DEVICE: DENTURES LOWER;DENTURES UPPER;WALKER;WHEELCHAIR
LACK_OF_TRANSPORTATION: NO
TOILETING: INDEPENDENT
FEEDING YOURSELF: INDEPENDENT
BATHING: NEEDS ASSISTANCE
ADEQUATE_TO_COMPLETE_ADL: YES
PATIENT'S MEMORY ADEQUATE TO SAFELY COMPLETE DAILY ACTIVITIES?: YES
JUDGMENT_ADEQUATE_SAFELY_COMPLETE_DAILY_ACTIVITIES: YES

## 2023-12-03 ASSESSMENT — PAIN SCALES - GENERAL
PAINLEVEL_OUTOF10: 0 - NO PAIN

## 2023-12-03 ASSESSMENT — PAIN - FUNCTIONAL ASSESSMENT
PAIN_FUNCTIONAL_ASSESSMENT: 0-10
PAIN_FUNCTIONAL_ASSESSMENT: 0-10

## 2023-12-03 ASSESSMENT — LIFESTYLE VARIABLES
AUDIT-C TOTAL SCORE: 1
REASON UNABLE TO ASSESS: NO
HAVE PEOPLE ANNOYED YOU BY CRITICIZING YOUR DRINKING: NO
AUDIT-C TOTAL SCORE: 1
HAVE YOU EVER FELT YOU SHOULD CUT DOWN ON YOUR DRINKING: NO
EVER FELT BAD OR GUILTY ABOUT YOUR DRINKING: NO
PRESCIPTION_ABUSE_PAST_12_MONTHS: NO
HOW OFTEN DO YOU HAVE A DRINK CONTAINING ALCOHOL: MONTHLY OR LESS
SKIP TO QUESTIONS 9-10: 1
EVER HAD A DRINK FIRST THING IN THE MORNING TO STEADY YOUR NERVES TO GET RID OF A HANGOVER: NO
HOW OFTEN DO YOU HAVE 6 OR MORE DRINKS ON ONE OCCASION: NEVER
SUBSTANCE_ABUSE_PAST_12_MONTHS: NO
HOW MANY STANDARD DRINKS CONTAINING ALCOHOL DO YOU HAVE ON A TYPICAL DAY: 1 OR 2

## 2023-12-03 ASSESSMENT — PAIN DESCRIPTION - PROGRESSION: CLINICAL_PROGRESSION: GRADUALLY IMPROVING

## 2023-12-03 NOTE — ED TRIAGE NOTES
Patient brought in  by EMS stating that she has been congested for the past 2 days. She states she has been coughing up mucus and has a productive cough but it becomes hard to breathe when she lays down. Patient has a history of COPD and is a former smoker (quit 15 yrs ago). Patient's SpO2 as 79% when EMS arrived and went up to 95% on 4L. She is currently on a high flow nasal canula 25 L/min 55 FiO2

## 2023-12-03 NOTE — ED PROVIDER NOTES
HPI   No chief complaint on file.      HPI  See my MDM                  No data recorded                Patient History   Past Medical History:   Diagnosis Date    Cervical stenosis of spine     COPD (chronic obstructive pulmonary disease) (CMS/HCC)     GERD (gastroesophageal reflux disease)     HLD (hyperlipidemia)     Osteoarthritis     Pulmonary fibrosis (CMS/HCC)     Stroke (CMS/HCC)      Past Surgical History:   Procedure Laterality Date    CHOLECYSTECTOMY  02/20/2014    Cholecystectomy    OTHER SURGICAL HISTORY  08/01/2019    Cervical vertebral fusion    OTHER SURGICAL HISTORY  08/01/2019    Lumbar laminectomy     No family history on file.  Social History     Tobacco Use    Smoking status: Former     Types: Cigarettes    Smokeless tobacco: Never   Vaping Use    Vaping Use: Never used   Substance Use Topics    Alcohol use: Not Currently    Drug use: Never       Physical Exam   ED Triage Vitals   Temp Pulse Resp BP   -- -- -- --      SpO2 Temp src Heart Rate Source Patient Position   -- -- -- --      BP Location FiO2 (%)     -- --       Physical Exam  CONSTITUTIONAL: Vital signs reviewed as charted, well-developed and mild to moderate respiratory distress  Eyes: Extraocular muscles are intact. Pupils equal round and reactive to light. Conjunctiva are pink.    ENT: Mucous membranes are moist. Tongue in the midline. Pharynx was without erythema or exudates, uvula midline  LUNGS: Coarse breath sounds with expiratory wheezing throughout, tripod position, increased work of breathing.  Pulse ox document in the chart.  HEART: Regular rate and rhythm without murmur thrill or rub, strong tones, auscultation is normal.  ABDOMEN: Soft and nontender without guarding rebound rigidity or mass. Bowel sounds are present and normal in all quadrants. There is no palpable masses or aneurysms identified. No hepatosplenomegaly, normal abdominal exam.  Neuro: The patient is awake, alert and oriented ×3. Moving all 4 extremities and  answering questions appropriately.   MUSCULOSKELETAL: The calves are nontender to palpation. Full gross active range of motion.   PSYCH: Awake alert oriented, normal mood and affect.  Skin:  Dry, normal color, warm to the touch, no rash present.      ED Course & MDM   ED Course as of 12/03/23 1656   Sun Dec 03, 2023   1200 Patient feeling much better at this time, doing well on the high flow oxygen.  Increased work of breathing has improved dramatically. [RJ]      ED Course User Index  [RJ] CARMELA Emery-CNP         Diagnoses as of 12/03/23 1656   COPD exacerbation (CMS/HCC)   Acute respiratory failure with hypoxia (CMS/HCC)   Congestive heart failure, unspecified HF chronicity, unspecified heart failure type (CMS/HCC)       Medical Decision Making  History obtained from: patient    Vital signs, nursing notes, current medications, past medical history, Surgical history, allergies, social history, family History were reviewed.         HPI:  Patient 73-year-old female history of COPD sent to the emergency room today 2 days history of worsening shortness of breath.  Was picked up by EMS pulse ox in the low 70s, they gave 2 DuoNeb's in route and noted on the DuoNebs her pulse ox jumped up in the 90s.  On arrival here to the ED they put her back on a nasal cannula and at 5 L her pulse ox was at 84%.  Patient with mild increased work of breathing.  States she had a thick productive cough over the last 2 days.  Worsening shortness of breath.  No fevers chills or night sweats.  Denies dizziness, chest pain, abdominal pain or lower extremity edema.      10 point ROS was reviewed and negative except Noted above in HPI.  DDX: as listed above    Chest x-ray interpreted by the radiologist showed:  Impression:    No significant interval change from 11/19/2023 and 03/23/2023.  Diffuse interstitial coarsening and chronic appearing patchy airspace  opacities, most likely due to chronic fibrosis. No new airspace  opacities  are seen.                  CT angio of the chest interpreted by the radiologist shows:  Impression:    1. No pulmonary embolus.  2. Chronic parenchymal and interstitial lung disease with scattered  fibrosis and bronchiectasis, similar in appearance to the prior  examination. No definitive acute consolidation.              Medications administered during this visit (name and route): Albuterol isolation, IV mag, IV Solu-Medrol  EKG interpretted by my attending physician    GETACHEW Summary/considerations:  I spoke with Dr. Rice. We thoroughly discussed the history, physical exam, laboratory and imaging studies, as well as, emergency department course. Based upon that discussion, we've decided to admit for further observation and evaluation of their acute COPD exacerbation, acute respiratory failure with hypoxia.  As I have deemed necessary from their history, physical, and studies, I have considered and evaluated for the following diagnoses: ACUTE CORONARY SYNDROME, PERICARDIAL TAMPONADE, PNEUMOTHORAX, P ULMONARY EMBOLISM, and THORACIC DISSECTION.     Patient CT angio shows pulmonary fibrosis but no evidence of fluid or pneumonia.  Patient is feeling better after medications here in the ED however currently still on high flow 35 L 60%.  Doing well breathing much easier at this time.  She is feeling better.  Was given dose antibiotics given her early sepsis parameters.  She has no evidence of leukocytosis.  Initial lactic acid 1.9.  Initial proBNP 2148, initial troponin 38.  Negative COVID-19 influenza swab.  Patient admitted for further evaluation and care.        I saw this patient in conjunction with Dr. Jerome, please see her supervision note.    After reviewing patient's comorbidities, severity of history of presenting illness, labs and imaging if obtained in conjunction with physical exam and course in emergency department, deemed to have potential for deterioration/progression of symptoms that could lead to  multiple morbidities or mortality, decision made that patient requires further observation/evaluation/treatment and patient admitted to appropriate service, patient/family understand and agree with plan.      Critical Care: CRITICAL CARE NOTE     The patient was reevaluated/re-examined multiple times during the visit. Critical care time includes management at bedside, discussion with other providers and consultants, family counseling and answering questions, and documentation. Care involves decision making of high complexity to assess, manipulate, and support vital organ system failure and/or to prevent further life threatening deterioration of the patient's condition. Failure to initiate these interventions on an urgent basis would likely result in sudden, clinically significant or life threatening deterioration in the patient's condition of acute COPD exacerbation, acute respiratory failure with hypoxia       Critical care time total at least 36 minutes of non concurrent critical care time provided by myself. This did not include any separate billable procedures.              Prescriptions provided include: none    This chart was completed using voice recognition transcription software. Please excuse any errors of transcription including grammatical, punctuation, syntax and spelling errors.  Please contact me with any questions regarding this chart.    Procedure  Procedures     CARMELA Emery-CLIFF  12/03/23 2324

## 2023-12-04 ENCOUNTER — APPOINTMENT (OUTPATIENT)
Dept: CARDIOLOGY | Facility: HOSPITAL | Age: 73
DRG: 189 | End: 2023-12-04
Payer: COMMERCIAL

## 2023-12-04 LAB
ANION GAP SERPL CALC-SCNC: 10 MMOL/L
BUN SERPL-MCNC: 15 MG/DL (ref 8–25)
CALCIUM SERPL-MCNC: 8.7 MG/DL (ref 8.5–10.4)
CHLORIDE SERPL-SCNC: 104 MMOL/L (ref 97–107)
CO2 SERPL-SCNC: 28 MMOL/L (ref 24–31)
CREAT SERPL-MCNC: 0.5 MG/DL (ref 0.4–1.6)
ERYTHROCYTE [DISTWIDTH] IN BLOOD BY AUTOMATED COUNT: 14.4 % (ref 11.5–14.5)
GFR SERPL CREATININE-BSD FRML MDRD: >90 ML/MIN/1.73M*2
GLUCOSE SERPL-MCNC: 165 MG/DL (ref 65–99)
HCT VFR BLD AUTO: 45.4 % (ref 36–46)
HGB BLD-MCNC: 13.9 G/DL (ref 12–16)
MCH RBC QN AUTO: 28.7 PG (ref 26–34)
MCHC RBC AUTO-ENTMCNC: 30.6 G/DL (ref 32–36)
MCV RBC AUTO: 94 FL (ref 80–100)
NRBC BLD-RTO: 0 /100 WBCS (ref 0–0)
PLATELET # BLD AUTO: 355 X10*3/UL (ref 150–450)
POTASSIUM SERPL-SCNC: 4.1 MMOL/L (ref 3.4–5.1)
RBC # BLD AUTO: 4.84 X10*6/UL (ref 4–5.2)
SODIUM SERPL-SCNC: 142 MMOL/L (ref 133–145)
TSH SERPL DL<=0.05 MIU/L-ACNC: 0.16 MIU/L (ref 0.27–4.2)
WBC # BLD AUTO: 6.3 X10*3/UL (ref 4.4–11.3)

## 2023-12-04 PROCEDURE — 94640 AIRWAY INHALATION TREATMENT: CPT

## 2023-12-04 PROCEDURE — 2500000004 HC RX 250 GENERAL PHARMACY W/ HCPCS (ALT 636 FOR OP/ED): Performed by: INTERNAL MEDICINE

## 2023-12-04 PROCEDURE — 9420000001 HC RT PATIENT EDUCATION 5 MIN

## 2023-12-04 PROCEDURE — 97162 PT EVAL MOD COMPLEX 30 MIN: CPT | Mod: GP

## 2023-12-04 PROCEDURE — 80048 BASIC METABOLIC PNL TOTAL CA: CPT | Performed by: INTERNAL MEDICINE

## 2023-12-04 PROCEDURE — 36415 COLL VENOUS BLD VENIPUNCTURE: CPT | Performed by: INTERNAL MEDICINE

## 2023-12-04 PROCEDURE — 97530 THERAPEUTIC ACTIVITIES: CPT | Mod: GO

## 2023-12-04 PROCEDURE — 2500000001 HC RX 250 WO HCPCS SELF ADMINISTERED DRUGS (ALT 637 FOR MEDICARE OP): Performed by: INTERNAL MEDICINE

## 2023-12-04 PROCEDURE — 2060000001 HC INTERMEDIATE ICU ROOM DAILY

## 2023-12-04 PROCEDURE — 84443 ASSAY THYROID STIM HORMONE: CPT | Performed by: INTERNAL MEDICINE

## 2023-12-04 PROCEDURE — 97166 OT EVAL MOD COMPLEX 45 MIN: CPT | Mod: GO

## 2023-12-04 PROCEDURE — 85027 COMPLETE CBC AUTOMATED: CPT | Performed by: INTERNAL MEDICINE

## 2023-12-04 PROCEDURE — 94664 DEMO&/EVAL PT USE INHALER: CPT

## 2023-12-04 PROCEDURE — 97530 THERAPEUTIC ACTIVITIES: CPT | Mod: GP

## 2023-12-04 PROCEDURE — 2500000002 HC RX 250 W HCPCS SELF ADMINISTERED DRUGS (ALT 637 FOR MEDICARE OP, ALT 636 FOR OP/ED): Performed by: INTERNAL MEDICINE

## 2023-12-04 PROCEDURE — 93005 ELECTROCARDIOGRAM TRACING: CPT

## 2023-12-04 RX ORDER — DOXYCYCLINE 100 MG/1
100 CAPSULE ORAL EVERY 12 HOURS SCHEDULED
Status: DISCONTINUED | OUTPATIENT
Start: 2023-12-04 | End: 2023-12-06 | Stop reason: HOSPADM

## 2023-12-04 RX ORDER — GUAIFENESIN 100 MG/5ML
200 SOLUTION ORAL EVERY 4 HOURS PRN
Status: DISCONTINUED | OUTPATIENT
Start: 2023-12-04 | End: 2023-12-06 | Stop reason: HOSPADM

## 2023-12-04 RX ORDER — PREDNISONE 20 MG/1
20 TABLET ORAL 2 TIMES DAILY
Status: DISCONTINUED | OUTPATIENT
Start: 2023-12-04 | End: 2023-12-06 | Stop reason: HOSPADM

## 2023-12-04 RX ORDER — GUAIFENESIN 600 MG/1
600 TABLET, EXTENDED RELEASE ORAL 2 TIMES DAILY PRN
Status: DISCONTINUED | OUTPATIENT
Start: 2023-12-04 | End: 2023-12-06 | Stop reason: HOSPADM

## 2023-12-04 RX ADMIN — PREDNISONE 20 MG: 20 TABLET ORAL at 20:26

## 2023-12-04 RX ADMIN — PREDNISONE 20 MG: 20 TABLET ORAL at 14:35

## 2023-12-04 RX ADMIN — METHYLPREDNISOLONE SODIUM SUCCINATE 40 MG: 40 INJECTION, POWDER, FOR SOLUTION INTRAMUSCULAR; INTRAVENOUS at 05:18

## 2023-12-04 RX ADMIN — IPRATROPIUM BROMIDE AND ALBUTEROL SULFATE 3 ML: 2.5; .5 SOLUTION RESPIRATORY (INHALATION) at 15:54

## 2023-12-04 RX ADMIN — BUDESONIDE INHALATION 0.5 MG: 0.5 SUSPENSION RESPIRATORY (INHALATION) at 19:40

## 2023-12-04 RX ADMIN — IPRATROPIUM BROMIDE AND ALBUTEROL SULFATE 3 ML: 2.5; .5 SOLUTION RESPIRATORY (INHALATION) at 13:00

## 2023-12-04 RX ADMIN — DOXYCYCLINE HYCLATE 100 MG: 100 CAPSULE ORAL at 20:26

## 2023-12-04 RX ADMIN — IPRATROPIUM BROMIDE AND ALBUTEROL SULFATE 3 ML: 2.5; .5 SOLUTION RESPIRATORY (INHALATION) at 19:40

## 2023-12-04 RX ADMIN — GUAIFENESIN 600 MG: 600 TABLET ORAL at 06:40

## 2023-12-04 RX ADMIN — ACETAMINOPHEN 650 MG: 325 TABLET ORAL at 14:02

## 2023-12-04 RX ADMIN — Medication 81 MG: at 08:51

## 2023-12-04 RX ADMIN — DOXYCYCLINE HYCLATE 100 MG: 100 CAPSULE ORAL at 08:51

## 2023-12-04 ASSESSMENT — COGNITIVE AND FUNCTIONAL STATUS - GENERAL
DRESSING REGULAR UPPER BODY CLOTHING: A LITTLE
TURNING FROM BACK TO SIDE WHILE IN FLAT BAD: A LITTLE
CLIMB 3 TO 5 STEPS WITH RAILING: TOTAL
HELP NEEDED FOR BATHING: A LITTLE
MOBILITY SCORE: 12
TURNING FROM BACK TO SIDE WHILE IN FLAT BAD: A LITTLE
DRESSING REGULAR UPPER BODY CLOTHING: A LITTLE
WALKING IN HOSPITAL ROOM: TOTAL
DRESSING REGULAR LOWER BODY CLOTHING: A LITTLE
PERSONAL GROOMING: A LITTLE
EATING MEALS: A LITTLE
WALKING IN HOSPITAL ROOM: TOTAL
DRESSING REGULAR LOWER BODY CLOTHING: A LITTLE
CLIMB 3 TO 5 STEPS WITH RAILING: TOTAL
TOILETING: A LOT
TOILETING: A LOT
CLIMB 3 TO 5 STEPS WITH RAILING: TOTAL
MOVING TO AND FROM BED TO CHAIR: A LOT
DAILY ACTIVITIY SCORE: 16
PERSONAL GROOMING: A LITTLE
EATING MEALS: A LITTLE
TOILETING: A LOT
STANDING UP FROM CHAIR USING ARMS: A LITTLE
MOVING FROM LYING ON BACK TO SITTING ON SIDE OF FLAT BED WITH BEDRAILS: A LITTLE
MOVING TO AND FROM BED TO CHAIR: A LITTLE
PERSONAL GROOMING: A LITTLE
HELP NEEDED FOR BATHING: A LOT
WALKING IN HOSPITAL ROOM: TOTAL
TURNING FROM BACK TO SIDE WHILE IN FLAT BAD: A LITTLE
HELP NEEDED FOR BATHING: A LOT
MOVING TO AND FROM BED TO CHAIR: A LOT
EATING MEALS: A LITTLE
MOBILITY SCORE: 14
DAILY ACTIVITIY SCORE: 16
STANDING UP FROM CHAIR USING ARMS: A LOT
DAILY ACTIVITIY SCORE: 17
MOBILITY SCORE: 12
DRESSING REGULAR LOWER BODY CLOTHING: A LITTLE
DRESSING REGULAR UPPER BODY CLOTHING: A LITTLE
MOVING FROM LYING ON BACK TO SITTING ON SIDE OF FLAT BED WITH BEDRAILS: A LITTLE
STANDING UP FROM CHAIR USING ARMS: A LOT
MOVING FROM LYING ON BACK TO SITTING ON SIDE OF FLAT BED WITH BEDRAILS: A LITTLE

## 2023-12-04 ASSESSMENT — PAIN - FUNCTIONAL ASSESSMENT
PAIN_FUNCTIONAL_ASSESSMENT: FLACC (FACE, LEGS, ACTIVITY, CRY, CONSOLABILITY)
PAIN_FUNCTIONAL_ASSESSMENT: FLACC (FACE, LEGS, ACTIVITY, CRY, CONSOLABILITY)
PAIN_FUNCTIONAL_ASSESSMENT: 0-10

## 2023-12-04 ASSESSMENT — ACTIVITIES OF DAILY LIVING (ADL)
FEEDING: OTHER (COMMENT)
ADLS_ADDRESSED: NO
ADL_ASSISTANCE: NEEDS ASSISTANCE
BATHING_ASSISTANCE: NOT PERFORMED
GROOMING_ASSISTANCE: OTHER (COMMENT)
BATHING_ASSISTANCE: MINIMAL
BATHING_ASSISTANCE: MINIMAL
ADL_ASSISTANCE: NEEDS ASSISTANCE

## 2023-12-04 ASSESSMENT — PAIN SCALES - GENERAL
PAINLEVEL_OUTOF10: 4
PAINLEVEL_OUTOF10: 0 - NO PAIN
PAINLEVEL_OUTOF10: 0 - NO PAIN
PAINLEVEL_OUTOF10: 2
PAINLEVEL_OUTOF10: 0 - NO PAIN

## 2023-12-04 ASSESSMENT — PAIN DESCRIPTION - LOCATION: LOCATION: HEAD

## 2023-12-04 NOTE — H&P
History Of Present Illness  Melanie Hodges is a 73 y.o. female presenting with shortness of breath.  She has a diagnosis of pulmonary fibrosis according to medical records.  She had COVID-19 infection in 2021 and states that her breathing and respiratory status have not been the same since then.  She has been experiencing shortness of breath over the past 2 days.  She had a cough productive of clear sputum, there was no fever, nausea, vomiting or diarrhea or sore throat.  Her oral intake had been decreased..  Today she had an oxygen saturation of 70% on room air.  She was placed on high flow nasal oxygen at 5 L/min, FiO2 of 60%.  At the time of this encounter she was on 25 L/min, FiO2 of 55%.   She reports that she had been on home oxygen for about a year and was no longer using it and the oxygen was taken away sometime last year.  She has oxygen saturation levels typically in the high 80s at home and she reported that the readings improved when she takes deep breaths  A CT angio of the chest was negative for pulmonary embolism.  It showed chronic parenchymal and interstitial lung disease with scattered fibrosis and bronchiectasis     Past Medical History  Past Medical History:   Diagnosis Date    Cervical stenosis of spine     COPD (chronic obstructive pulmonary disease) (CMS/HCC)     COVID-19 2021    GERD (gastroesophageal reflux disease)     HLD (hyperlipidemia)     Osteoarthritis     Pulmonary fibrosis (CMS/HCC)     Stroke (CMS/HCC)     Vertigo        Surgical History  Past Surgical History:   Procedure Laterality Date    CERVICAL FUSION      CHOLECYSTECTOMY  02/20/2014    Cholecystectomy    LUMBAR LAMINECTOMY          Social History  She reports that she quit smoking about 15 years ago. Her smoking use included cigarettes. She started smoking about 55 years ago. She has a 40.00 pack-year smoking history. She has never used smokeless tobacco. She reports that she does not currently use alcohol. She reports that  "she does not use drugs.    Family History  Family History   Problem Relation Name Age of Onset    Hypertension Mother      Diabetes Mother      Hypertension Maternal Grandmother          Allergies  Patient has no known allergies.    Review of Systems   General: Negative for fever,  chills or fatigue.    HEENT: Negative for headache, blurring of vision or double vision.    Cardiovascular: Negative for chest pain, palpitations or orthopnea.    Respiratory: As in the HPI   Gastrointestinal: Negative for nausea, vomiting, hematemesis, abdominal pain or diarrhea.   Genitourinary: Negative for dysuria, hematuria, frequency or nocturia.   Musculoskeletal: Negative for joint pain, joint swelling or deformity.   Skin: Negative for rash, itching, or jaundice.   Hematologic: Negative for bleeding or bruising.   Neurologic: Negative for headache, loss of consciousness. syncope or seizures       Physical Exam    General.: Awake alert well-developed, responsive   HEENT: Normocephalic, not icteric, not pale, no facial asymmetry, no pharyngeal erythema.   Neck: Supple, no carotid bruit, no thyroid enlargement.   Cardiovascular: Regular heart rate and rhythm normal S1 and S2.   Respiratory: Bilateral expiratory rhonchi, no crackles   Abdomen: Soft, nontender to palpation, bowel sounds present and normoactive.   Extremities: No peripheral cyanosis, no pedal edema.   Neurologic: Alert and oriented to self, place and date, muscle strength was decreased, 4/5,  in the left upper extremity and left lower extremity   Dermatologic: No rash, ecchymosis, or jaundice.   Psychological: Appropriate affect and behavior.      Last Recorded Vitals  Blood pressure 125/73, pulse 101, temperature 37 °C (98.6 °F), resp. rate 16, height 1.626 m (5' 4\"), weight 63.5 kg (140 lb), SpO2 93 %.    Relevant Results  Results for orders placed or performed during the hospital encounter of 12/03/23 (from the past 24 hour(s))   NT-PROBNP   Result Value Ref Range "    PROBNP 2,148 (H) 0 - 624 pg/mL   CBC   Result Value Ref Range    WBC 8.6 4.4 - 11.3 x10*3/uL    nRBC 0.0 0.0 - 0.0 /100 WBCs    RBC 5.28 (H) 4.00 - 5.20 x10*6/uL    Hemoglobin 15.4 12.0 - 16.0 g/dL    Hematocrit 48.8 (H) 36.0 - 46.0 %    MCV 92 80 - 100 fL    MCH 29.2 26.0 - 34.0 pg    MCHC 31.6 (L) 32.0 - 36.0 g/dL    RDW 14.5 11.5 - 14.5 %    Platelets 385 150 - 450 x10*3/uL   Comprehensive Metabolic Panel   Result Value Ref Range    Glucose 100 (H) 65 - 99 mg/dL    Sodium 144 133 - 145 mmol/L    Potassium 3.3 (L) 3.4 - 5.1 mmol/L    Chloride 105 97 - 107 mmol/L    Bicarbonate 27 24 - 31 mmol/L    Urea Nitrogen 18 8 - 25 mg/dL    Creatinine 0.60 0.40 - 1.60 mg/dL    eGFR >90 >60 mL/min/1.73m*2    Calcium 8.7 8.5 - 10.4 mg/dL    Albumin 3.6 3.5 - 5.0 g/dL    Alkaline Phosphatase 107 35 - 125 U/L    Total Protein 7.0 5.9 - 7.9 g/dL    AST 16 5 - 40 U/L    Bilirubin, Total 0.2 0.1 - 1.2 mg/dL    ALT 6 5 - 40 U/L    Anion Gap 12 <=19 mmol/L   Blood Gas Lactic Acid, Venous   Result Value Ref Range    POCT Lactate, Venous 1.9 0.4 - 2.0 mmol/L   Serial Troponin, Initial ([facility])   Result Value Ref Range    Troponin T, High Sensitivity 38 (H) <=15 ng/L   SARS-CoV-2 RT PCR   Result Value Ref Range    Coronavirus 2019, PCR Not Detected Not Detected   Influenza A, and B PCR   Result Value Ref Range    Flu A Result Not Detected Not Detected    Flu B Result Not Detected Not Detected   Serial Troponin, 2 Hour ([facility])   Result Value Ref Range    Troponin T, High Sensitivity 26 (H) <=15 ng/L          Assessment/Plan   Principal Problem:    COPD exacerbation (CMS/HCC)    Acute on chronic hypoxic respiratory failure  Currently requiring high flow nasal oxygen.  She appears to have underlying chronic respiratory failure with pulmonary fibrosis  Inhaled bronchodilators.  Inhaled steroids  Intravenous steroids  Antibiotic coverage with doxycycline  Wean high flow nasal oxygen as tolerated  Pulmonary consult    Hypokalemia  Potassium  replaced    History of cerebrovascular accident  Hal mild left-sided weakness  Continue daily aspirin      Tanya Rice MD

## 2023-12-04 NOTE — ED NOTES
Report called and given to CEDRIC Gonzalez. Will transport patient to floor.      Melisa Johnson RN  12/03/23 [id number]

## 2023-12-04 NOTE — CONSULTS
"Reason For Consult  COPD exacerbation    History Of Present Illness  73-year-old female carries a diagnosis of COPD.  States she quit smoking 15 years ago and does not wear home oxygen but has had a prescribed in the past.  Over the past several days she noticed a cough productive of yellow sputum and felt hot but did not actually have a fever.  She initially noted some wheezing that has resolved.  No chest pain, palpitations or pedal edema.  She was noted to be hypoxic in the emergency room and is required 6 L of oxygen nasal cannula.     Past Medical History  She has a past medical history of Cervical stenosis of spine, COPD (chronic obstructive pulmonary disease) (CMS/[facility]), COVID-19 (2021), GERD (gastroesophageal reflux disease), HLD (hyperlipidemia), Osteoarthritis, Pulmonary fibrosis (CMS/[facility]), Stroke (CMS/[facility]), and Vertigo.    Surgical History  She has a past surgical history that includes Cholecystectomy (02/20/2014); Cervical fusion; and Lumbar laminectomy.    Review of Systems     Noncontributory     Social History  She reports that she quit smoking about 15 years ago. Her smoking use included cigarettes. She started smoking about 55 years ago. She has a 40.00 pack-year smoking history. She has never used smokeless tobacco. She reports that she does not currently use alcohol. She reports that she does not use drugs.    Family History  Family History   Problem Relation Name Age of Onset    Hypertension Mother      Diabetes Mother      Hypertension Maternal Grandmother          Allergies  Patient has no known allergies.    Last Recorded Vitals  Blood pressure 128/84, pulse 93, temperature 36.2 °C (97.2 °F), temperature source Temporal, resp. rate 20, height 1.626 m (5' 4\"), weight 53.8 kg (118 lb 8 oz), SpO2 93 %.     Physical Exam        12/3/2023     7:14 PM 12/3/2023     8:45 PM 12/3/2023    10:23 PM 12/3/2023    10:58 PM 12/4/2023     3:14 AM 12/4/2023     6:55 AM 12/4/2023     8:10 AM   Vitals   Systolic " "125 125 150 129 120 128    Diastolic 73 86 92 80 76 84    Heart Rate 101 97 98 94 80 84 93   Temp   36.2 °C (97.2 °F) 36.8 °C (98.2 °F) 36.4 °C (97.5 °F) 36.2 °C (97.2 °F)    Resp 16 16 20 22 17 20    Height (in)   1.626 m (5' 4\")       Weight (lb)   118.5       BMI   20.34 kg/m2       BSA (m2)   1.56 m2           1. vital signs reviewed  2. general appearance -thin chronically ill female looking her stated age no acute distress, wearing oxygen  3. Skin -warm and dry, no rash or diaphoresis  4. HEENT-conjunctiva are pink.  Sclerae are anicteric.  Nares are unremarkable.  Pharynx is unremarkable  5. Neck-supple.  No JVP.  No thyromegaly.  6. Adenopathy-no peripheral adenopathy appreciable  7. Chest examination-lungs are diminished but clear bilaterally without wheezing rales or rhonchi.  Symmetric excursion.  No fremitus.  8. Heart sounds are normal S1 and S2.  No murmurs rubs or gallops.  9. Abdomen-soft and nontender without hepatosplenomegaly, no rebound, no distention.  Normoactive bowel sounds  10. Extremities-no calf tenderness, cyanosis clubbing, or edema  11. musculoskeletal-no joint effusion or swelling.  12. Neurologic-awake and alert, moves all extremities symmetrically, follows commands appropriately       Labs    Serum chemistries are unremarkable.  CBC is unremarkable.    Xrays:  (personally reviewed)    CAT scan of the chest was personally reviewed.  Diffuse fibrotic changes with traction bronchiectasis but no acute infiltrates or congestive heart failure    Impression    .  Acute bronchitis  .  Acute hypoxic respiratory failure  .  Interstitial lung disease  .  COPD  .  Ex-smoker    Plan    .  Continue supplemental oxygen.  I suspect this patient will need home oxygen and that will be determined at the time of discharge  .  Continue aerosolized bronchodilators and aerosolized steroids  .  Oral prednisone  .  Doxycycline to oral  .  Check FEV1 tomorrow    Thank you      Hussein Brooks MD, [facility]    "

## 2023-12-04 NOTE — CARE PLAN
Problem: Pain  Goal: My pain/discomfort is manageable  Outcome: Progressing     Problem: Safety  Goal: Patient will be injury free during hospitalization  Outcome: Progressing  Goal: I will remain free of falls  Outcome: Progressing     Problem: Daily Care  Goal: Daily care needs are met  Outcome: Progressing     Problem: Psychosocial Needs  Goal: Demonstrates ability to cope with hospitalization/illness  Outcome: Progressing  Goal: Collaborate with me, my family, and caregiver to identify my specific goals  Outcome: Progressing  Flowsheets (Taken 12/3/2023 6201)  Cultural Requests During Hospitalization: N/A  Spiritual Requests During Hospitalization: N/A     Problem: Discharge Barriers  Goal: My discharge needs are met  Outcome: Progressing     Problem: Respiratory  Goal: Clear secretions with interventions this shift  Outcome: Progressing  Goal: Minimize anxiety/maximize coping throughout shift  Outcome: Progressing  Goal: Minimal/no exertional discomfort or dyspnea this shift  Outcome: Progressing  Goal: No signs of respiratory distress (eg. Use of accessory muscles. Peds grunting)  Outcome: Progressing  Goal: Patent airway maintained this shift  Outcome: Progressing  Goal: Tolerate mechanical ventilation evidenced by VS/agitation level this shift  Outcome: Progressing  Goal: Tolerate pulmonary toileting this shift  Outcome: Progressing  Goal: Verbalize decreased shortness of breath this shift  Outcome: Progressing  Goal: Wean oxygen to maintain O2 saturation per order/standard this shift  Outcome: Progressing  Goal: Increase self care and/or family involvement in next 24 hours  Outcome: Progressing     Problem: Fall/Injury  Goal: Not fall by end of shift  Outcome: Progressing  Goal: Be free from injury by end of the shift  Outcome: Progressing  Goal: Verbalize understanding of personal risk factors for fall in the hospital  Outcome: Progressing  Goal: Verbalize understanding of risk factor reduction measures  to prevent injury from fall in the home  Outcome: Progressing  Goal: Use assistive devices by end of the shift  Outcome: Progressing  Goal: Pace activities to prevent fatigue by end of the shift  Outcome: Progressing   The patient's goals for the shift include Sleep    The clinical goals for the shift include Decrease oxygen needs

## 2023-12-04 NOTE — PROGRESS NOTES
Melanie Hodges is a 73 y.o. female on day 1 of admission presenting with COPD exacerbation (CMS/[facility]).      Subjective   Treating for COPD exacerbation.  She is improving.  She is on prednisone and doxycycline.  I find her on 6 L nasal cannula pulse oxing in the high 90s very comfortable       Objective   Alert oriented undistressed  Heart regular rate and rhythm without murmurs rubs or gallops  Lungs decreased breath sounds prolonged expiratory phase  Abdomen soft nontender nondistended  Extremities no significant edema.    Last Recorded Vitals  /78 (BP Location: Right arm, Patient Position: Sitting)   Pulse 92   Temp 36.3 °C (97.3 °F) (Temporal)   Resp 16   Wt 53.8 kg (118 lb 8 oz)   SpO2 96%   Intake/Output last 3 Shifts:    Intake/Output Summary (Last 24 hours) at 12/4/2023 1234  Last data filed at 12/4/2023 0800  Gross per 24 hour   Intake 1290 ml   Output 0 ml   Net 1290 ml         Assessment/Plan      COPD exacerbation with hypoxemic respiratory failure-will order oxygen from 6 L to 4 L.  She may have to go home with oxygen.  She feels well.  Continue prednisone and doxycycline.  Appreciate pulmonary input.  Physical therapy recommending moderate intensity rehab.  Patient has a wheelchair at home and she is chronically debilitated and she does not want to go to a skilled nursing facility.    Huy Raygoza MD

## 2023-12-04 NOTE — PROGRESS NOTES
Melanie Hodges is a 73 y.o. female on day 1 of admission presenting with COPD exacerbation (CMS/[facility]).    TCC met with patient at bedside. Introduced self and explained role. Patient lives home alone. There are no steps to enter and no steps within the home. Patient has shower chair, and is wheelchair bound at home.  No reports of smoking or alcohol use. Patient has a home care aide that comes M-F for 5 hours and helps with almost everything. Aide takes her to the store and helps with ADLs. Patient states she has an appointment with new PCP, can't think of MD name at this time . [facility] is pharmacy of choice for medications. Patient states she has POA but can't remember if it is signed. Declined paperwork at this time. Plan is to discharge home, states that she needs to be home by the end of the week or she will loose her aide and she doesn't want that to happen.    Sunita Purdy RN

## 2023-12-04 NOTE — NURSING NOTE
Ot was in to evaluate pt,assisted out of bed to chair 2 bedsidewith 1 assist.Pt tolerating sitting up well,pt encouraged to do more forself.

## 2023-12-04 NOTE — PROGRESS NOTES
Physical Therapy    Physical Therapy Evaluation & Treatment    Patient Name: Melanie Hodges  MRN: [id number]  Today's Date: 12/4/2023   Time Calculation  Start Time: 0834  Stop Time: 0853  Time Calculation (min): 19 min    Assessment/Plan   PT Assessment  PT Assessment Results: Decreased strength, Decreased endurance, Impaired balance, Decreased mobility, Decreased coordination, Decreased safety awareness  Rehab Prognosis: Good  Evaluation/Treatment Tolerance: Patient limited by fatigue  Medical Staff Made Aware: Yes  Strengths: Premorbid level of function  Barriers to Participation: Comorbidities  End of Session Communication: Bedside nurse  Assessment Comment: pt with decreased functional mobility and is requiring physicla assistance for transfers at this time. pt alos is requiring 6L of supplemental oxygen and is still desatting with activity. pt is a high risk for falls and will benefit from continued skilled therapy services to improve her funcitonal mobility back to her baseline and maximize safety for homegoing.  End of Session Patient Position: Up in chair   IP OR SWING BED PT PLAN  Inpatient or Swing Bed: Inpatient  PT Plan  Treatment/Interventions: Bed mobility, Transfer training, Balance training, Strengthening, Endurance training, Therapeutic exercise, Therapeutic activity, Wheelchair management  PT Plan: Skilled PT  PT Frequency: 4 times per week  PT Discharge Recommendations: Moderate intensity level of continued care  Equipment Recommended upon Discharge: Wheelchair  PT Recommended Transfer Status: Assist x1  PT - OK to Discharge: Yes      Subjective     General Visit Information:  General  Reason for Referral: Gait Training, Impaired Mobiltiy  Referred By: Tanya Rice MD  Past Medical History Relevant to Rehab: COPD, HLD, CVA, GERD, pulmonary fibrosis, OA, vertigo, COVID-19  Family/Caregiver Present: No  Prior to Session Communication: Bedside nurse  Patient Position Received: Up in chair,  Alarm off, not on at start of session  Preferred Learning Style: verbal  General Comment: 73 y.o. female admitted with SOB for a few days, hypoxic in the ED with pulse ox in the 70s. CT angio chest is negative for pulmonary emboli.  Home Living:  Home Living  Type of Home: Apartment  Lives With: Alone  Home Adaptive Equipment: Walker rolling or standard, Wheelchair-manual, Other (Comment)  Home Layout: One level  Home Access: Level entry  Bathroom Shower/Tub: Tub/shower unit  Bathroom Toilet: Adaptive toilet seating  Bathroom Equipment: Raised toilet seat with rails, Tub transfer bench, Grab bars in shower  Home Living Comments: Patient reports living alone in the apartment, assist from aide 5 days/week for 5 hrs  Prior Level of Function:  Prior Function Per Pt/Caregiver Report  Level of [place]: Independent with ADLs and functional transfers, Needs assistance with ADLs, Needs assistance with homemaking  Receives Help From: Home health  ADL Assistance: Needs assistance  Bath: Minimal  Toileting:  (independent)  Dressing:  (independent)  Grooming:  (independent)  Feeding:  (independent)  Homemaking Assistance: Needs assistance  Homemaking Assistance Comments: Patient reports aide does all the I ADL's and driving.  Ambulatory Assistance:  (nonambulatory since CVA in 2021)  Vocational: Retired  Hand Dominance: Right  Prior Function Comments: Patient reports independent with ADL , assisted for sponge bathe, is wheel chair bound since 2021, is able to use BUE/LE to wheel herself, uses the walker to transfer, non ambulatory. Patient reports assisted for all I ADL's and  driving.  Precautions:  Precautions  Hearing/Visual Limitations: WFL hearing, glasses all the time.  Medical Precautions: Fall precautions, Oxygen therapy device and L/min  Vital Signs:  Vital Signs  Heart Rate: 101  Heart Rate Source: Monitor  Resp: 20  SpO2: 94 % (on ^L O2 via NC, down to 88% with activity)  Patient Position: Sitting    Objective  "  Pain:  Pain Assessment  Pain Assessment: 0-10  Pain Score: 0 - No pain  Cognition:  Cognition  Overall Cognitive Status: Within Functional Limits  Orientation Level: Oriented X4    General Assessments:  General Observation  General Observation: pt with productive cough, able to carry on a conversation despite SOB, visible skin intact.     Activity Tolerance  Endurance: Tolerates less than 10 min exercise, no significant change in vital signs  Activity Tolerance Comments: poor, desaturatesw with activity, takes several minutes to recover    Sensation  Sensation Comment: denies paresthesias at this time. states her L LE \"tingles sometimes\"    Coordination  Coordination Comment: very slight decreased quality of movement L LE compared to the R LE    Postural Control  Posture Comment: forward flexed at hips and trunk, rounded shoulders, head-down posture    Static Sitting Balance  Static Sitting-Balance Support: Bilateral upper extremity supported, Feet supported  Static Sitting-Level of Assistance: Independent  Static Sitting-Comment/Number of Minutes: sitting in bedside chair without difficulty    Static Standing Balance  Static Standing-Balance Support: Bilateral upper extremity supported  Static Standing-Level of Assistance: Contact guard  Static Standing-Comment/Number of Minutes: pt keeping hands on arms of chair when standing. stood inplace x 1 minutes with desaturation to 88% on 6L O2 via NC.  Functional Assessments:  ADL  ADL's Addressed: No    Bed Mobility  Bed Mobility: No    Transfers  Transfer:  (min A to guide trunk up to stand from the chair, min A for eccentric control in sitting.)    Ambulation/Gait Training  Ambulation/Gait Training Performed: No    Stairs  Stairs: No  Extremity/Trunk Assessments:  RLE   RLE : Within Functional Limits  LLE   LLE :  (slight weakness compared to R LE, minimal)  Treatments:  Therapeutic Exercise  Therapeutic Exercise Performed:  (pt able to perform a few reps B LE seated " ther ex: AP, LAQ, hip flexion without difficulty. slightly SOB. encouraged to perform while in the chair, very weak hip flexors noted.)    Balance/Neuromuscular Re-Education  Balance/Neuromuscular Re-Education Activity Performed:  (able to  place with the contact guard assistance and B hands in contact with the arms of the chair x 1 minute, increased SOB afterwards.)  Outcome Measures:  [facility] Basic Mobility  Turning from your back to your side while in a flat bed without using bedrails: A little  Moving from lying on your back to sitting on the side of a flat bed without using bedrails: A little  Moving to and from bed to chair (including a wheelchair): A little  Standing up from a chair using your arms (e.g. wheelchair or bedside chair): A little  To walk in hospital room: Total  Climbing 3-5 steps with railing: Total  Basic Mobility - Total Score: 14    Encounter Problems       Encounter Problems (Active)       Balance       LTG - Patient will maintain balance to allow for safe mobility during transfers (Progressing)       Start:  12/04/23    Expected End:  12/31/23               Mobility       LTG - Patient will propel wheelchair household distances negotiating obstacles safely with close supervision. (Not Progressing)       Start:  12/04/23    Expected End:  12/31/23               Safety       LTG - Patient will demonstrate safety requirements appropriate to situation/environment with close supervision (Progressing)       Start:  12/04/23    Expected End:  12/31/23               Transfers       LTG - Patient will transfer to and from wheelchair to bed with close supervision. (Not Progressing)       Start:  12/04/23    Expected End:  12/31/23            STG - Patient will transfer sit to and from stand with close supervision. (Progressing)       Start:  12/04/23    Expected End:  12/31/23                   Education Documentation  Mobility Training, taught by Kanika Hansen PT at 12/4/2023 10:29  MARGARITA  Learner: Patient  Readiness: Acceptance  Method: Explanation  Response: Verbalizes Understanding    Education Comments  No comments found.

## 2023-12-04 NOTE — NURSING NOTE
COPD education/handout given to patient. Discussed importance of taking medications as prescribed/following up with physician appointments.

## 2023-12-04 NOTE — CARE PLAN
Problem: Respiratory  Goal: Minimize anxiety/maximize coping throughout shift  Outcome: Progressing

## 2023-12-04 NOTE — PROGRESS NOTES
Occupational Therapy    Evaluation/Treatment    Patient Name: Melanie Hodges  MRN: [id number]  : 1950  Today's Date: 23  Time Calculation  Start Time: 810  Stop Time: 830  Time Calculation (min): 20 min       Assessment:  OT Assessment: 73 year old female who comes to the ED for SOB x2 days is here for COPD exacerbation and is on 6 liters of O2. Patient hypoxic with minimal activty this date, is limited this date with decreased strength, decreased balance, decreased endurance,is functioning below her baeline, will benefit from continued skilled OT intervention.  Prognosis: Good  Barriers to Discharge: Decreased caregiver support  Evaluation/Treatment Tolerance: Patient limited by fatigue  Medical Staff Made Aware: Yes  End of Session Communication: Bedside nurse  End of Session Patient Position: Up in chair (all needs within reach.)  OT Assessment Results: Decreased ADL status, Decreased upper extremity strength, Decreased safe judgment during ADL, Decreased endurance, Decreased functional mobility, Decreased IADLs  Prognosis: Good  Barriers to Discharge: Decreased caregiver support  Evaluation/Treatment Tolerance: Patient limited by fatigue  Medical Staff Made Aware: Yes  Strengths: Premorbid level of function  Plan:  Treatment Interventions: ADL retraining, Visual perceptual retraining, Functional transfer training, UE strengthening/ROM, Endurance training, Patient/family training, Neuromuscular reeducation, Fine motor coordination activities, Compensatory technique education  OT Frequency: 4 times per week  OT Discharge Recommendations: Moderate intensity level of continued care  Equipment Recommended upon Discharge: Wheelchair, Wheeled walker  OT Recommended Transfer Status: Assist of 1  OT - OK to Discharge: Yes  Treatment Interventions: ADL retraining, Visual perceptual retraining, Functional transfer training, UE strengthening/ROM, Endurance training, Patient/family training, Neuromuscular  reeducation, Fine motor coordination activities, Compensatory technique education    Subjective   Current Problem:  1. COPD exacerbation (CMS/[facility])        2. Acute respiratory failure with hypoxia (CMS/[facility])        3. Congestive heart failure, unspecified HF chronicity, unspecified heart failure type (CMS/[facility])          General:   OT Received On: 12/04/23  General  Reason for Referral: Decreased ADL, weakness.  Referred By: Dr Salvador  Past Medical History Relevant to Rehab: History Date Comments  COPD (chronic obstructive pulmonary disease) (CMS/[facility])    HLD (hyperlipidemia)    Stroke (CMS/[facility])    GERD (gastroesophageal reflux disease)    Pulmonary fibrosis (CMS/[facility])    Osteoarthritis    Cervical stenosis of spine    COVID-19 2021   Vertigo  Family/Caregiver Present: No  Prior to Session Communication: Bedside nurse  Patient Position Received: Bed, 3 rail up, Alarm on  Preferred Learning Style: verbal  General Comment: Cleared to see patient for OT, patient agreeable to therapy. Patient met in the room in the bed, completed breakfast, on 6 liters of O2.  Precautions:  Hearing/Visual Limitations: WFL hearing, glasses all the time.  Medical Precautions: Fall precautions, Oxygen therapy device and L/min (6 liters of O2)  Precautions Comment: Patient instructed in the use of call light this date, recommend bed/chair alarm  for safety.  Vital Signs:  Heart Rate: 93  SpO2: 93 % (Patients SpO2 was 87-93 % this   date with activtiy.)  Patient Position: Sitting  Pain:  Pain Assessment  Pain Assessment: 0-10  Pain Score: 0 - No pain    Objective   Cognition:  Overall Cognitive Status: Within Functional Limits  Problem Solving: Exceptions to WFL  Complex Functional Tasks: Impaired  Safety/Judgement: Within Functional Limits  Insight: Mild    Home Living:  Type of Home: Apartment  Lives With: Alone  Home Adaptive Equipment: Walker rolling or standard, Wheelchair-manual, Other (Comment) (Medical alert buttom.)  Home Layout: One  level  Home Access: Level entry  Bathroom Shower/Tub: Tub/shower unit  Bathroom Toilet: Adaptive toilet seating (raised toilet seat)  Bathroom Equipment: Raised toilet seat with rails, Tub transfer bench, Grab bars in shower  Home Living Comments: Patient reports living alone in the apartment, assist from aide 5 days/week for 5 hrs.  Prior Function:  Level of [place]: Independent with ADLs and functional transfers, Needs assistance with ADLs, Needs assistance with homemaking  Receives Help From: Home health (Aide who comes 5 days/week for 5 hrs.)  ADL Assistance: Needs assistance  Bath: Minimal  Toileting:  (Independent)  Dressing: Other (comment) (Independent)  Grooming: Other (Comment) (Independent)  Feeding: Other (Comment) (Independent)  Homemaking Assistance: Needs assistance  Homemaking Assistance Comments: Patient reports aide does all the I ADL's and driving.  Ambulatory Assistance:  (Patient reports does not ambulate since CVA 2021)  Vocational: Retired  Hand Dominance: Right  Prior Function Comments: Patient reports independent with ADL , assisted for sponge bathe, is wheel chair bound since 2021, is able to use BUE/LE to wheel herself, uses the walker to transfer, non ambulatory. Patient reports assisted for all I ADL's and  driving.  IADL History:  Homemaking Responsibilities: No (Patient reports does minimal cleaning if needed .)  IADL Comments: Patient has aide 5 hrs day , 5 days xweeek who does all the I ADl's.  ADL:  Eating Assistance: Independent  Eating Deficit: Setup  Grooming Assistance: Independent  Grooming Deficit: Setup, Denture care, Wash/dry face  Bathing Assistance: Not performed  Bathing Deficit:  (Patient hypoxic, waiting for respiratory therapist to do a breathing treatment.)  UE Dressing Assistance: Minimal  UE Dressing Deficit:  (juvenal gown and tie this date.)  LE Dressing Assistance: Minimal  LE Dressing Deficit: Don/doff R sock, Don/doff L sock, Increased time to complete,  Supervision/safety  Toileting Assistance with Device: Not performed  Functional Assistance: Minimal  Activities of Daily Living:      Grooming  Grooming Level of Assistance: Setup  Grooming Where Assessed: Chair  Grooming Comments: Patient was set up to complete denture care this date in the chair.    UE Dressing  UE Dressing Level of Assistance: Close supervision, Setup  UE Dressing Where Assessed: Chair  UE Dressing Comments: juvenal/doff socks this date    Activity Tolerance:  Endurance: Tolerates less than 10 min exercise with changes in vital signs  Activity Tolerance Comments: Poor, Patients SpO2 dropped to 87% with minimal activity this date. Patient was instructed in energy conservation techniques and pursed lipped breathing this date.  Functional Standing Tolerance:     Bed Mobility/Transfers: Bed Mobility  Bed Mobility: Yes (Patient needed Close Supervision with increased time this date to get to the EOB this date with the HOB raised this date, limited this date with hypoxina on 6 liters of O2. Patient needed multiple rest breaks this date.)    Transfers  Transfer: Yes (Patient was able to transfer fron the bed -chair with minimal assist with moderate V/C for safety and to use the LUE this date.)       Sitting Balance:  Static Sitting Balance  Static Sitting-Balance Support: Feet supported  Static Sitting-Level of Assistance: Close supervision  Dynamic Sitting Balance  Dynamic Sitting-Balance: Forward lean, Lateral lean (to juvenal /doff socks this date)  Dynamic Sitting-Comments: Close Supervision for safety.    Vision:Vision - Basic Assessment  Current Vision: Wears glasses all the time  Sensation:  Light Touch: No apparent deficits  Strength:  Strength Comments: 3+/5 overall    Perception:  Inattention/Neglect: Cues to attend to left side of body  Coordination:  Movements are Fluid and Coordinated: Yes   Hand Function:  Hand Function  Gross Grasp: Functional  Coordination: Functional        Outcome Measures:  [facility] Daily Activity  Putting on and taking off regular lower body clothing: A little  Bathing (including washing, rinsing, drying): A lot  Putting on and taking off regular upper body clothing: A little  Toileting, which includes using toilet, bedpan or urinal: A lot  Taking care of personal grooming such as brushing teeth: A little  Eating Meals: A little  Daily Activity - Total Score: 16        :       Goals:  Encounter Problems       Encounter Problems (Active)       OT Goals       OT Goal 1 (Progressing)       Start:  12/04/23    Expected End:  12/18/23       Patient will be able to complete grooming, UB/LB dressing and bathing with modified independence.         OT Goal 2 (Progressing)       Start:  12/04/23    Expected End:  12/25/23       Patient will be able to complete toileting and LB dressing/bathing with Close Supervision using AE and good safety as well as using energy conservation techniques.         OT Goal 3 (Progressing)       Start:  12/04/23    Expected End:  12/25/23       Patient will be able to complete functional transfers from W/C level to the bed, toilet , chair with Distant Supervision using good safety and with G balance.          OT Goal 4 (Progressing)       Start:  12/04/23    Expected End:  12/25/23       Patient will be increased strength in BUE to 4+/5 to assist with W/C mobility, functional transfers and ADL tasks.

## 2023-12-04 NOTE — NURSING NOTE
Pt is awake ,alert and orientedx4,Sr with occasional pvc's and pac's.Spo2 94% with oxygen @ 6L via n/c,willcontinue to monitor spo2 ,encouraged to do deepbreathing exercises.Wound care consult placed re:buttock discolaration,pt is w/chair bound.

## 2023-12-04 NOTE — NURSING NOTE
Patient arrived from the ER with the nurse and tech. Patient currently wearing a NRB. VSS. RT at bedside, transitioned patient to 6L. Patient with no complaints of pain. Skin assessed with CEDRIC Thompson, notable for healed pressure injury on buttock. Preventative mepilexs applied. Patient already aware of incentive spirometer, IPCs placed. Bed alarm and call light in place.

## 2023-12-05 LAB
ATRIAL RATE: 97 BPM
P AXIS: 52 DEGREES
P OFFSET: 196 MS
P ONSET: 148 MS
PR INTERVAL: 142 MS
Q ONSET: 219 MS
QRS COUNT: 16 BEATS
QRS DURATION: 80 MS
QT INTERVAL: 366 MS
QTC CALCULATION(BAZETT): 464 MS
QTC FREDERICIA: 429 MS
R AXIS: -45 DEGREES
T AXIS: 93 DEGREES
T OFFSET: 402 MS
VENTRICULAR RATE: 97 BPM

## 2023-12-05 PROCEDURE — 97535 SELF CARE MNGMENT TRAINING: CPT | Mod: GO,CO

## 2023-12-05 PROCEDURE — 94760 N-INVAS EAR/PLS OXIMETRY 1: CPT

## 2023-12-05 PROCEDURE — 2060000001 HC INTERMEDIATE ICU ROOM DAILY

## 2023-12-05 PROCEDURE — 9420000001 HC RT PATIENT EDUCATION 5 MIN

## 2023-12-05 PROCEDURE — 2500000004 HC RX 250 GENERAL PHARMACY W/ HCPCS (ALT 636 FOR OP/ED): Performed by: INTERNAL MEDICINE

## 2023-12-05 PROCEDURE — 2500000002 HC RX 250 W HCPCS SELF ADMINISTERED DRUGS (ALT 637 FOR MEDICARE OP, ALT 636 FOR OP/ED): Performed by: INTERNAL MEDICINE

## 2023-12-05 PROCEDURE — 94640 AIRWAY INHALATION TREATMENT: CPT

## 2023-12-05 PROCEDURE — 2500000001 HC RX 250 WO HCPCS SELF ADMINISTERED DRUGS (ALT 637 FOR MEDICARE OP): Performed by: INTERNAL MEDICINE

## 2023-12-05 PROCEDURE — 97530 THERAPEUTIC ACTIVITIES: CPT | Mod: GO,CO

## 2023-12-05 RX ORDER — IPRATROPIUM BROMIDE AND ALBUTEROL SULFATE 2.5; .5 MG/3ML; MG/3ML
3 SOLUTION RESPIRATORY (INHALATION)
Status: DISCONTINUED | OUTPATIENT
Start: 2023-12-05 | End: 2023-12-06 | Stop reason: HOSPADM

## 2023-12-05 RX ORDER — OXYMETAZOLINE HCL 0.05 %
2 SPRAY, NON-AEROSOL (ML) NASAL EVERY 12 HOURS PRN
Status: DISCONTINUED | OUTPATIENT
Start: 2023-12-05 | End: 2023-12-06 | Stop reason: HOSPADM

## 2023-12-05 RX ADMIN — ACETAMINOPHEN 650 MG: 325 TABLET ORAL at 20:43

## 2023-12-05 RX ADMIN — ACETAMINOPHEN 650 MG: 325 TABLET ORAL at 09:32

## 2023-12-05 RX ADMIN — PREDNISONE 20 MG: 20 TABLET ORAL at 20:44

## 2023-12-05 RX ADMIN — OXYMETAZOLINE HYDROCHLORIDE 2 SPRAY: 0.05 SPRAY NASAL at 22:08

## 2023-12-05 RX ADMIN — IPRATROPIUM BROMIDE AND ALBUTEROL SULFATE 3 ML: 2.5; .5 SOLUTION RESPIRATORY (INHALATION) at 13:00

## 2023-12-05 RX ADMIN — BUDESONIDE INHALATION 0.5 MG: 0.5 SUSPENSION RESPIRATORY (INHALATION) at 19:34

## 2023-12-05 RX ADMIN — Medication 81 MG: at 09:30

## 2023-12-05 RX ADMIN — DOXYCYCLINE HYCLATE 100 MG: 100 CAPSULE ORAL at 09:30

## 2023-12-05 RX ADMIN — BUDESONIDE INHALATION 0.5 MG: 0.5 SUSPENSION RESPIRATORY (INHALATION) at 07:14

## 2023-12-05 RX ADMIN — DOXYCYCLINE HYCLATE 100 MG: 100 CAPSULE ORAL at 20:44

## 2023-12-05 RX ADMIN — PREDNISONE 20 MG: 20 TABLET ORAL at 09:30

## 2023-12-05 RX ADMIN — IPRATROPIUM BROMIDE AND ALBUTEROL SULFATE 3 ML: 2.5; .5 SOLUTION RESPIRATORY (INHALATION) at 07:14

## 2023-12-05 RX ADMIN — IPRATROPIUM BROMIDE AND ALBUTEROL SULFATE 3 ML: 2.5; .5 SOLUTION RESPIRATORY (INHALATION) at 19:34

## 2023-12-05 ASSESSMENT — COGNITIVE AND FUNCTIONAL STATUS - GENERAL
MOVING TO AND FROM BED TO CHAIR: A LOT
CLIMB 3 TO 5 STEPS WITH RAILING: TOTAL
HELP NEEDED FOR BATHING: A LITTLE
STANDING UP FROM CHAIR USING ARMS: A LOT
PERSONAL GROOMING: A LITTLE
DRESSING REGULAR LOWER BODY CLOTHING: A LITTLE
DAILY ACTIVITIY SCORE: 17
DRESSING REGULAR LOWER BODY CLOTHING: A LITTLE
WALKING IN HOSPITAL ROOM: TOTAL
TURNING FROM BACK TO SIDE WHILE IN FLAT BAD: A LITTLE
DRESSING REGULAR UPPER BODY CLOTHING: A LITTLE
TOILETING: A LOT
TOILETING: A LOT
PERSONAL GROOMING: A LITTLE
MOBILITY SCORE: 12
DAILY ACTIVITIY SCORE: 18
HELP NEEDED FOR BATHING: A LITTLE
EATING MEALS: A LITTLE
MOVING FROM LYING ON BACK TO SITTING ON SIDE OF FLAT BED WITH BEDRAILS: A LITTLE
DRESSING REGULAR UPPER BODY CLOTHING: A LITTLE

## 2023-12-05 ASSESSMENT — PAIN SCALES - GENERAL
PAINLEVEL_OUTOF10: 4
PAINLEVEL_OUTOF10: 0 - NO PAIN
PAINLEVEL_OUTOF10: 2
PAINLEVEL_OUTOF10: 6
PAINLEVEL_OUTOF10: 0 - NO PAIN
PAINLEVEL_OUTOF10: 0 - NO PAIN

## 2023-12-05 ASSESSMENT — PAIN - FUNCTIONAL ASSESSMENT
PAIN_FUNCTIONAL_ASSESSMENT: 0-10

## 2023-12-05 ASSESSMENT — ACTIVITIES OF DAILY LIVING (ADL): HOME_MANAGEMENT_TIME_ENTRY: 20

## 2023-12-05 NOTE — PROGRESS NOTES
Melanie Hodges is a 73 y.o. female on day 2 of admission presenting with COPD exacerbation (CMS/HCC).    TCC spoke to pt regarding therapy recommendations for SNF. Patient refusing at this time would like to discharge home with [facility] for PT/OT and will have her aid 5 days a week. List provided     1300: patient set up with [facility]     Sunita Purdy RN

## 2023-12-05 NOTE — PROGRESS NOTES
FOLLOWUP FOR: COPD exacerbation    SUBJECTIVE  Patient states her breathing is improved.  Still coughing and congested.  Notes that she is productive of sputum.  Remains on 4 L nasal cannula    PHYSICAL EXAM        12/4/2023     8:34 AM 12/4/2023    11:17 AM 12/4/2023     3:05 PM 12/4/2023     7:14 PM 12/4/2023    11:00 PM 12/5/2023     4:17 AM 12/5/2023     7:21 AM   Vitals   Systolic  124 128 134 124 125 130   Diastolic  78 81 83 81 71 71   Heart Rate 101 92 105 83 106 68 86   Temp  36.3 °C (97.3 °F) 36.3 °C (97.3 °F) 36.8 °C (98.2 °F) 36.6 °C (97.9 °F) 36.4 °C (97.5 °F) 36.3 °C (97.3 °F)   Resp 20 16 24 22 20 17 20       Vital signs reviewed   NAD, awake and alert, chronically ill, O2   Lungs Symmetric excursions.  Auscultation - diminished but clear, no wheezing/rales/rhonchi.     Cor RSR No murmur, rub, gallop   Abd soft and nontender, no rebound or distention, no HS megaly   Ext no CCE   Neuro no focal deficits.  moves all extremities symmetrically.  speech normal.  affect normal    LABS    Serum chemistries are unremarkable.  CBC is unremarkable    ASSESSMENT:    .  Acute bronchitis  .  Acute hypoxic respiratory failure  .  Interstitial lung disease  .  COPD  .  Ex-smoker    PLAN    .  Wean supplemental oxygen as sats allow  .  Continue aerosolized bronchodilators and aerosolized steroids  .  Oral prednisone  .  Doxycycline to oral  .  Check FEV1  .  Discharge plans    Hussein Brooks MD, [facility]

## 2023-12-05 NOTE — NURSING NOTE
Patient A&O x 4, on 4L, coarse lung sounds throughout. PIV in place. Tele notable for NSR. Patient without any complaints. IS and IPCs in place. Bed alarm and call light in place.

## 2023-12-05 NOTE — CARE PLAN
The patient's goals for the shift include Sleep    The clinical goals for the shift include decrease oxygen    Problem: Skin  Goal: Decreased wound size/increased tissue granulation at next dressing change  Outcome: Progressing  Flowsheets (Taken 12/5/2023 1216)  Decreased wound size/increased tissue granulation at next dressing change: Promote sleep for wound healing  Goal: Participates in plan/prevention/treatment measures  Outcome: Progressing  Flowsheets (Taken 12/5/2023 1216)  Participates in plan/prevention/treatment measures:   Discuss with provider PT/OT consult   Increase activity/out of bed for meals  Goal: Prevent/manage excess moisture  Outcome: Progressing  Flowsheets (Taken 12/5/2023 1216)  Prevent/manage excess moisture:   Moisturize dry skin   Cleanse incontinence/protect with barrier cream  Goal: Prevent/minimize sheer/friction injuries  Outcome: Progressing  Flowsheets (Taken 12/5/2023 1216)  Prevent/minimize sheer/friction injuries:   Use pull sheet   Turn/reposition every 2 hours/use positioning/transfer devices   HOB 30 degrees or less  Goal: Promote/optimize nutrition  Outcome: Progressing  Flowsheets (Taken 12/5/2023 1216)  Promote/optimize nutrition:   Discuss with provider if NPO > 2 days   Monitor/record intake including meals   Offer water/supplements/favorite foods  Goal: Promote skin healing  Outcome: Progressing  Flowsheets (Taken 12/5/2023 1216)  Promote skin healing:   Turn/reposition every 2 hours/use positioning/transfer devices   Protective dressings over bony prominences   Rotate device position/do not position patient on device

## 2023-12-05 NOTE — CARE PLAN
Problem: Respiratory  Goal: Clear secretions with interventions this shift  Outcome: Progressing  Goal: Minimize anxiety/maximize coping throughout shift  Outcome: Progressing  Goal: Minimal/no exertional discomfort or dyspnea this shift  Outcome: Progressing  Goal: No signs of respiratory distress (eg. Use of accessory muscles. Peds grunting)  Outcome: Progressing  Goal: Wean oxygen to maintain O2 saturation per order/standard this shift  Outcome: Progressing

## 2023-12-05 NOTE — CARE PLAN
Problem: Pain  Goal: My pain/discomfort is manageable  Outcome: Progressing     Problem: Safety  Goal: Patient will be injury free during hospitalization  Outcome: Progressing  Goal: I will remain free of falls  Outcome: Progressing     Problem: Daily Care  Goal: Daily care needs are met  Outcome: Progressing     Problem: Psychosocial Needs  Goal: Demonstrates ability to cope with hospitalization/illness  Outcome: Progressing  Goal: Collaborate with me, my family, and caregiver to identify my specific goals  Outcome: Progressing     Problem: Discharge Barriers  Goal: My discharge needs are met  Outcome: Progressing   The patient's goals for the shift include Sleep    The clinical goals for the shift include Decrease oxygen needs

## 2023-12-05 NOTE — PROGRESS NOTES
Melanie Hodges is a 73 y.o. female on day 2 of admission presenting with COPD exacerbation (CMS/HCC).      Subjective   Feeling better.  Wants me to tell her that she can be discharged home tomorrow.  Pulse oxing in the 90s on 2 L.       Objective   Alert oriented nondistressed  Heart regular rate and rhythm without murmurs rubs or gallops  Lungs creased breath sounds throughout  Abdomen soft nontender nondistended  Extremities no significant edema    Last Recorded Vitals  /89 (BP Location: Right arm, Patient Position: Lying)   Pulse 103   Temp 36.6 °C (97.9 °F) (Oral)   Resp 15   Wt 53.8 kg (118 lb 8 oz)   SpO2 97%   Intake/Output last 3 Shifts:    Intake/Output Summary (Last 24 hours) at 12/5/2023 1414  Last data filed at 12/5/2023 0427  Gross per 24 hour   Intake --   Output 200 ml   Net -200 ml       Admission Weight  Weight: 63.5 kg (140 lb) (12/03/23 1236)    Daily Weight  12/03/23 : 53.8 kg (118 lb 8 oz)    Image Results  ECG 12 Lead  Sinus rhythm with occasional Premature ventricular complexes  Left anterior fascicular block  Cannot rule out Anterior infarct , age undetermined  Abnormal ECG    Confirmed by Edmodn Holt ([id number]) on 12/5/2023 8:39:23 AM      Physical Exam    Relevant Results               Assessment/Plan   COPD exacerbation with hypoxemia.  She is getting better.  Will plan on sending her home tomorrow we will get respiratory therapy to do home O2 certification.  She will need a prescription for doxycycline and prednisone.  Home care.  She says she was not taking anything at home for stroke prophylaxis.  We have her on baby aspirin.    Huy Raygoza MD

## 2023-12-05 NOTE — PROGRESS NOTES
Occupational Therapy    OT Treatment    Patient Name: Melanie Hodges  MRN: [id number]  Today's Date: 12/5/2023  Time Calculation  Start Time: 1531  Stop Time: 1602  Time Calculation (min): 31 min         Assessment:  OT Assessment: Gradual progress made towards OT goals. Continue with current OT POC to maximize safety and independence during ADLs prior to discharge.  Evaluation/Treatment Tolerance: Patient tolerated treatment well  End of Session Communication: PCT/NA/CTA  End of Session Patient Position: Up in chair, Alarm off, not on at start of session (all needs in reach)  OT Assessment Results: Decreased ADL status, Decreased upper extremity strength, Decreased safe judgment during ADL, Decreased endurance, Decreased functional mobility, Decreased IADLs  Evaluation/Treatment Tolerance: Patient tolerated treatment well  Plan:  Treatment Interventions: ADL retraining, Visual perceptual retraining, Functional transfer training, UE strengthening/ROM, Endurance training, Patient/family training, Neuromuscular reeducation, Fine motor coordination activities, Compensatory technique education  OT Frequency: 4 times per week  OT Discharge Recommendations: Moderate intensity level of continued care  Equipment Recommended upon Discharge: Wheelchair, Wheeled walker  OT Recommended Transfer Status: Assist of 1  OT - OK to Discharge: Yes  Treatment Interventions: ADL retraining, Visual perceptual retraining, Functional transfer training, UE strengthening/ROM, Endurance training, Patient/family training, Neuromuscular reeducation, Fine motor coordination activities, Compensatory technique education    Subjective   Previous Visit Info:  OT Last Visit  OT Received On: 12/05/23  General:  General  Reason for Referral: Decreased ADL, weakness.  Past Medical History Relevant to Rehab: COPD, HLD, CVA, GERD, pulmonary fibrosis, OA, vertigo, COVID-19  Prior to Session Communication: Bedside nurse  Patient Position Received: Up in chair,  Alarm off, not on at start of session  General Comment: Pt cleared for OT session per nursing, pleasant and cooperative throughout tx session.  Precautions:  Medical Precautions: Fall precautions, Oxygen therapy device and L/min (2L O2 via NC)  Vital Signs:     Pain:  Pain Assessment  Pain Assessment: 0-10  Pain Score: 0 - No pain  Clinical Progression: Not changed    Objective    Cognition:     Coordination:  Movements are Fluid and Coordinated: No  Upper Body Coordination: impaired gross motor coordination  Activities of Daily Living: LE Dressing  LE Dressing: Yes  LE Dressing Comments: seated in bedside chair pt able to don undergarments and pants with Ankur and increased time using cross-leg technique. Once in standing pt required modA to don pants over hips  Functional Standing Tolerance:  Time: x2min  Activity: static standing with CGA  Functional Standing Tolerance Comments: Standing trials completed this date to increase strength and functional tolerance for safety and ease of ADL/ADL transfer completion. Standing trials completed x3 this date.  Bed Mobility/Transfers: Transfers  Transfer: Yes  Transfer 1  Trials/Comments 1: sit<>stands completed from standard chair height with Ankur progressing to CGA by last stand      Outcome Measures:[facility] Daily Activity  Putting on and taking off regular lower body clothing: A little  Bathing (including washing, rinsing, drying): A little  Putting on and taking off regular upper body clothing: A little  Toileting, which includes using toilet, bedpan or urinal: A lot  Taking care of personal grooming such as brushing teeth: A little  Eating Meals: A little  Daily Activity - Total Score: 17        Education Documentation  ADL Training, taught by DARREN Kapadia at 12/5/2023  5:48 PM.  Learner: Patient  Readiness: Acceptance  Method: Explanation, Demonstration  Response: Verbalizes Understanding, Demonstrated Understanding    Education Comments  Education reviewed on  role of OT/ POC, importance of OT to maximize safety and independence during functional tasks and safety awareness throughout functional tasks/ transfers. Questions, comments and concerns addressed regarding OT.      Goals:  Encounter Problems       Encounter Problems (Active)       Balance       LTG - Patient will maintain balance to allow for safe mobility during transfers (Progressing)       Start:  12/04/23    Expected End:  12/31/23               Mobility       LTG - Patient will propel wheelchair household distances negotiating obstacles safely with close supervision. (Not Progressing)       Start:  12/04/23    Expected End:  12/31/23               OT Goals       OT Goal 1 (Progressing)       Start:  12/04/23    Expected End:  12/18/23       Patient will be able to complete grooming, UB/LB dressing and bathing with modified independence.         OT Goal 2 (Progressing)       Start:  12/04/23    Expected End:  12/25/23       Patient will be able to complete toileting and LB dressing/bathing with Close Supervision using AE and good safety as well as using energy conservation techniques.         OT Goal 3 (Progressing)       Start:  12/04/23    Expected End:  12/25/23       Patient will be able to complete functional transfers from W/C level to the bed, toilet , chair with Distant Supervision using good safety and with G balance.          OT Goal 4 (Progressing)       Start:  12/04/23    Expected End:  12/25/23       Patient will be increased strength in BUE to 4+/5 to assist with W/C mobility, functional transfers and ADL tasks.             Safety       LTG - Patient will demonstrate safety requirements appropriate to situation/environment with close supervision (Progressing)       Start:  12/04/23    Expected End:  12/31/23               Transfers       LTG - Patient will transfer to and from wheelchair to bed with close supervision. (Not Progressing)       Start:  12/04/23    Expected End:  12/31/23             STG - Patient will transfer sit to and from stand with close supervision. (Progressing)       Start:  12/04/23    Expected End:  12/31/23

## 2023-12-05 NOTE — NURSING NOTE
Review of chart by HF Nurse Navigator. No indication active HF noted as of this review. No Cards consult in place. I will not be following at this time. Please submit HF Nurse Navigator referral if needed.

## 2023-12-05 NOTE — PROGRESS NOTES
Spiritual Care Visit    Clinical Encounter Type  Visited With: Patient  Routine Visit: Introduction  Continue Visiting: No         Values/Beliefs  Spiritual Requests During Hospitalization: Blesing only. No need for sacraments    Sacramental Encounters  Communion: Does not want communion  Communion Given Indicator: No  Sacrament of Sick-Anointing: Patient declined anointing     Sebastián Valero

## 2023-12-05 NOTE — NURSING NOTE
Assumed care of pt, pt is awake lying in bed, HR is 108 ST on tele, on 4LNC, pt denies pain, bed locked and lowered, VSS, bedside shift report given by CEDRIC Umanzor, continue to monitor, call light w/in reach.

## 2023-12-06 ENCOUNTER — PHARMACY VISIT (OUTPATIENT)
Dept: PHARMACY | Facility: CLINIC | Age: 73
End: 2023-12-06
Payer: MEDICARE

## 2023-12-06 ENCOUNTER — APPOINTMENT (OUTPATIENT)
Dept: RESPIRATORY THERAPY | Facility: HOSPITAL | Age: 73
End: 2023-12-06
Payer: COMMERCIAL

## 2023-12-06 VITALS
TEMPERATURE: 97.7 F | SYSTOLIC BLOOD PRESSURE: 137 MMHG | HEART RATE: 119 BPM | DIASTOLIC BLOOD PRESSURE: 85 MMHG | OXYGEN SATURATION: 96 % | BODY MASS INDEX: 20.23 KG/M2 | HEIGHT: 64 IN | WEIGHT: 118.5 LBS | RESPIRATION RATE: 28 BRPM

## 2023-12-06 PROBLEM — J44.1 COPD EXACERBATION (MULTI): Status: RESOLVED | Noted: 2023-11-20 | Resolved: 2023-12-06

## 2023-12-06 PROCEDURE — 9420000001 HC RT PATIENT EDUCATION 5 MIN

## 2023-12-06 PROCEDURE — RXMED WILLOW AMBULATORY MEDICATION CHARGE

## 2023-12-06 PROCEDURE — 2500000004 HC RX 250 GENERAL PHARMACY W/ HCPCS (ALT 636 FOR OP/ED): Performed by: INTERNAL MEDICINE

## 2023-12-06 PROCEDURE — 97530 THERAPEUTIC ACTIVITIES: CPT | Mod: GP

## 2023-12-06 PROCEDURE — 94760 N-INVAS EAR/PLS OXIMETRY 1: CPT

## 2023-12-06 PROCEDURE — 94640 AIRWAY INHALATION TREATMENT: CPT

## 2023-12-06 PROCEDURE — 2500000002 HC RX 250 W HCPCS SELF ADMINISTERED DRUGS (ALT 637 FOR MEDICARE OP, ALT 636 FOR OP/ED): Performed by: INTERNAL MEDICINE

## 2023-12-06 PROCEDURE — 94664 DEMO&/EVAL PT USE INHALER: CPT

## 2023-12-06 PROCEDURE — 2500000001 HC RX 250 WO HCPCS SELF ADMINISTERED DRUGS (ALT 637 FOR MEDICARE OP): Performed by: INTERNAL MEDICINE

## 2023-12-06 PROCEDURE — 94010 BREATHING CAPACITY TEST: CPT

## 2023-12-06 RX ORDER — BUDESONIDE AND FORMOTEROL FUMARATE DIHYDRATE 80; 4.5 UG/1; UG/1
2 AEROSOL RESPIRATORY (INHALATION)
Qty: 10.2 G | Refills: 0 | Status: SHIPPED | OUTPATIENT
Start: 2023-12-06 | End: 2024-01-05

## 2023-12-06 RX ORDER — GUAIFENESIN 600 MG/1
600 TABLET, EXTENDED RELEASE ORAL 2 TIMES DAILY
Qty: 20 TABLET | Refills: 0 | Status: SHIPPED | OUTPATIENT
Start: 2023-12-06 | End: 2023-12-16

## 2023-12-06 RX ORDER — ALBUTEROL SULFATE 90 UG/1
2 AEROSOL, METERED RESPIRATORY (INHALATION) EVERY 6 HOURS PRN
Qty: 18 G | Refills: 11 | Status: SHIPPED | OUTPATIENT
Start: 2023-12-06

## 2023-12-06 RX ORDER — DOXYCYCLINE 100 MG/1
100 CAPSULE ORAL EVERY 12 HOURS SCHEDULED
Qty: 6 CAPSULE | Refills: 0 | Status: SHIPPED | OUTPATIENT
Start: 2023-12-06 | End: 2023-12-09

## 2023-12-06 RX ORDER — PREDNISONE 20 MG/1
TABLET ORAL
Qty: 7 TABLET | Refills: 0 | Status: SHIPPED | OUTPATIENT
Start: 2023-12-06 | End: 2023-12-12

## 2023-12-06 RX ADMIN — Medication 81 MG: at 08:34

## 2023-12-06 RX ADMIN — IPRATROPIUM BROMIDE AND ALBUTEROL SULFATE 3 ML: 2.5; .5 SOLUTION RESPIRATORY (INHALATION) at 11:32

## 2023-12-06 RX ADMIN — BUDESONIDE INHALATION 0.5 MG: 0.5 SUSPENSION RESPIRATORY (INHALATION) at 07:12

## 2023-12-06 RX ADMIN — ACETAMINOPHEN 650 MG: 325 TABLET ORAL at 14:16

## 2023-12-06 RX ADMIN — PREDNISONE 20 MG: 20 TABLET ORAL at 08:35

## 2023-12-06 RX ADMIN — ACETAMINOPHEN 650 MG: 325 TABLET ORAL at 08:35

## 2023-12-06 RX ADMIN — DOXYCYCLINE HYCLATE 100 MG: 100 CAPSULE ORAL at 08:35

## 2023-12-06 RX ADMIN — IPRATROPIUM BROMIDE AND ALBUTEROL SULFATE 3 ML: 2.5; .5 SOLUTION RESPIRATORY (INHALATION) at 07:12

## 2023-12-06 ASSESSMENT — COGNITIVE AND FUNCTIONAL STATUS - GENERAL
WALKING IN HOSPITAL ROOM: TOTAL
MOVING TO AND FROM BED TO CHAIR: A LOT
STANDING UP FROM CHAIR USING ARMS: TOTAL
MOVING TO AND FROM BED TO CHAIR: A LOT
MOBILITY SCORE: 12
MOVING FROM LYING ON BACK TO SITTING ON SIDE OF FLAT BED WITH BEDRAILS: A LITTLE
DAILY ACTIVITIY SCORE: 15
CLIMB 3 TO 5 STEPS WITH RAILING: TOTAL
MOVING FROM LYING ON BACK TO SITTING ON SIDE OF FLAT BED WITH BEDRAILS: A LOT
TURNING FROM BACK TO SIDE WHILE IN FLAT BAD: A LITTLE
MOBILITY SCORE: 9
HELP NEEDED FOR BATHING: A LITTLE
DRESSING REGULAR UPPER BODY CLOTHING: A LITTLE
PERSONAL GROOMING: TOTAL
WALKING IN HOSPITAL ROOM: TOTAL
CLIMB 3 TO 5 STEPS WITH RAILING: TOTAL
STANDING UP FROM CHAIR USING ARMS: A LOT
DRESSING REGULAR LOWER BODY CLOTHING: A LITTLE
TURNING FROM BACK TO SIDE WHILE IN FLAT BAD: A LOT
TOILETING: TOTAL

## 2023-12-06 ASSESSMENT — PAIN - FUNCTIONAL ASSESSMENT
PAIN_FUNCTIONAL_ASSESSMENT: 0-10

## 2023-12-06 ASSESSMENT — PAIN SCALES - GENERAL
PAINLEVEL_OUTOF10: 6
PAINLEVEL_OUTOF10: 5 - MODERATE PAIN
PAINLEVEL_OUTOF10: 5 - MODERATE PAIN

## 2023-12-06 ASSESSMENT — PAIN DESCRIPTION - LOCATION: LOCATION: HEAD

## 2023-12-06 NOTE — SIGNIFICANT EVENT
On Ra at rest pt SpO2 on RA was 87%. Titrated to 3L for SpO2 on 92%-93% Pt does not walk she uses a wheelchair, so had the pt do some leg and arm exercise in bed and SpO2 went up to 95%-96%. So pt will need 3L NC continuous.   Destiny Lovelace, RRT

## 2023-12-06 NOTE — NURSING NOTE
Assuming care of patient, Repositioned. Bedside Report done. Assessed and charted, No complaint made. 95-96 % on 4 liters. Will monitor.

## 2023-12-06 NOTE — NURSING NOTE
Calls and needs anticipated. No changes in assessment. Conversant to staff, No breathing difficulty. Will continue to monitor.

## 2023-12-06 NOTE — NURSING NOTE
Assumed care of pt, pt is resting in bed, HR is 89 SR, on 2LNC, pt denies pain, bedside shift report given by CEDRIC Matias, bed locked and lowered, no complaints at this time, continue to monitor, call light w/in reach.

## 2023-12-06 NOTE — NURSING NOTE
Care continued. No change to assessment. Resting comfortably, Not in need at this time. Possible discharge today with home oxygen, Endorsed.

## 2023-12-06 NOTE — DISCHARGE SUMMARY
Discharge Diagnosis  COPD exacerbation (CMS/[facility])    Issues Requiring Follow-Up  COPD    Discharge Meds     Your medication list        START taking these medications        Instructions Last Dose Given Next Dose Due   albuterol 90 mcg/actuation inhaler      Inhale 2 puffs every 6 hours if needed for wheezing.       budesonide-formoteroL 80-4.5 mcg/actuation inhaler  Commonly known as: Symbicort      Inhale 2 puffs 2 times a day. Rinse mouth with water after use to reduce aftertaste and incidence of candidiasis. Do not swallow.       doxycycline 100 mg capsule  Commonly known as: Vibramycin      Take 1 capsule (100 mg) by mouth every 12 hours for 6 doses. Take with at least 8 ounces (large glass) of water, do not lie down for 30 minutes after       guaiFENesin 600 mg 12 hr tablet  Commonly known as: Mucinex      Take 1 tablet (600 mg) by mouth 2 times a day for 10 days. Do not crush, chew, or split.       oxygen gas therapy  Commonly known as: O2      Inhale 1 each continuously.       predniSONE 20 mg tablet  Commonly known as: Deltasone  Start taking on: December 6, 2023  Notes to patient: Follow taper instructons      Take 1 tablet (20 mg) by mouth 2 times a day for 2 days, Take 1 tablet (20 mg) once daily for 2 days, Then take 0.5 tablets (10 mg) once daily for 2 days.              CONTINUE taking these medications        Instructions Last Dose Given Next Dose Due   aspirin 81 mg EC tablet           loratadine 10 mg tablet  Commonly known as: Claritin           meclizine 25 mg tablet  Commonly known as: Antivert           umeclidinium 62.5 mcg/actuation inhalation  Commonly known as: Incruse Ellipta      Inhale 1 puff (62.5 mcg) once daily.                 Where to Get Your Medications        These medications were sent to [facility]  [id number] [address]      Hours: 9 AM to 6 PM Mon-Fri, 9 AM to 1 PM Sat Phone: [phone]   albuterol 90 mcg/actuation  inhaler  budesonide-formoteroL 80-4.5 mcg/actuation inhaler  doxycycline 100 mg capsule  guaiFENesin 600 mg 12 hr tablet  predniSONE 20 mg tablet       Information about where to get these medications is not yet available    Ask your nurse or doctor about these medications  oxygen gas therapy         Test Results Pending At Discharge  Pending Labs       Order Current Status    Blood Culture Preliminary result    Blood Culture Preliminary result            Hospital Course  73-year-old female, admitted 12/30/2023 for acute respiratory failure with hypoxia secondary to acute COPD exacerbation.  Placed on supplemental oxygen.  Seen by pulmonary.  Believe patient with acute bronchitis as well.  Given aerosolized bronchodilators, aerosolized steroids.  Oral steroids and oral antibiotics.  Patient with improvement.  However, patient will require oxygen going home, 3 L of oxygen via nasal cannula, continuous.  Patient will discharge home with existing home health care.  Patient will also go home with external referral for home care, PT OT at home.  Patient advised to follow-up with PCP and pulmonary within 1 week.    Pertinent Physical Exam At Time of Discharge  Physical Exam  Vitals reviewed.   Constitutional:       Appearance: Normal appearance. She is normal weight.   HENT:      Head: Normocephalic and atraumatic.      Nose: Nose normal.      Mouth/Throat:      Mouth: Mucous membranes are moist.   Eyes:      Extraocular Movements: Extraocular movements intact.      Pupils: Pupils are equal, round, and reactive to light.   Cardiovascular:      Rate and Rhythm: Normal rate.      Pulses: Normal pulses.      Heart sounds: Normal heart sounds.   Pulmonary:      Effort: Pulmonary effort is normal.      Breath sounds: Wheezing present.      Comments: Mild Expiratory wheezing noted  Abdominal:      General: Bowel sounds are normal.      Palpations: Abdomen is soft.   Musculoskeletal:         General: Normal range of motion.       Cervical back: Normal range of motion and neck supple.   Skin:     General: Skin is warm and dry.      Capillary Refill: Capillary refill takes less than 2 seconds.   Neurological:      General: No focal deficit present.      Mental Status: She is alert and oriented to person, place, and time.   Psychiatric:         Mood and Affect: Mood normal.         Behavior: Behavior normal.         Thought Content: Thought content normal.         Outpatient Follow-Up  No future appointments.    Follow-up with PCP, pulmonary within 1 week.    Tarsha Juarez, APRN-CNP

## 2023-12-06 NOTE — PROGRESS NOTES
"Melanie Hodges is a 73 y.o. female on day 3 of admission seen in follow-up for acute hypoxic respiratory failure, COPD.    Subjective   On 3 L nasal cannula oxygen; O2 sats 96%.  No respiratory complaints.  Denies pain.  Afebrile.       Objective     Physical Exam  Vitals and nursing note reviewed.   Constitutional:       Appearance: Normal appearance.   HENT:      Head: Normocephalic and atraumatic.      Nose: Nose normal.      Mouth/Throat:      Mouth: Mucous membranes are moist.   Eyes:      Extraocular Movements: Extraocular movements intact.      Conjunctiva/sclera: Conjunctivae normal.      Pupils: Pupils are equal, round, and reactive to light.   Cardiovascular:      Rate and Rhythm: Normal rate and regular rhythm.      Pulses: Normal pulses.      Heart sounds: Normal heart sounds.   Pulmonary:      Effort: Pulmonary effort is normal.      Comments: Lungs diminished but clear.  Abdominal:      General: Bowel sounds are normal.      Palpations: Abdomen is soft.   Musculoskeletal:         General: Normal range of motion.   Skin:     General: Skin is warm and dry.      Capillary Refill: Capillary refill takes less than 2 seconds.   Neurological:      General: No focal deficit present.      Mental Status: She is alert and oriented to person, place, and time.   Psychiatric:         Mood and Affect: Mood normal.         Behavior: Behavior normal.         Last Recorded Vitals  Blood pressure 137/85, pulse (!) 119, temperature 36.5 °C (97.7 °F), temperature source Temporal, resp. rate (!) 28, height 1.626 m (5' 4\"), weight 53.8 kg (118 lb 8 oz), SpO2 96 %.  Intake/Output last 3 Shifts:  I/O last 3 completed shifts:  In: - (0 mL/kg)   Out: 200 (3.7 mL/kg) [Urine:200 (0.1 mL/kg/hr)]  Dosing Weight: 53.8 kg     aspirin, 81 mg, oral, Daily  budesonide, 0.5 mg, nebulization, BID  doxycycline, 100 mg, oral, q12h EDI  ipratropium-albuteroL, 3 mL, nebulization, TID  predniSONE, 20 mg, oral, BID       PRN medications: " acetaminophen **OR** acetaminophen **OR** acetaminophen, guaiFENesin, guaiFENesin, ipratropium-albuteroL, loratadine, melatonin, ondansetron **OR** ondansetron, oxygen, oxymetazoline, polyethylene glycol       CT angio chest for pulmonary embolism  Result Date: 12/3/2023  Interpreted By:  Antwan Zhang, STUDY: CT ANGIO CHEST FOR PULMONARY EMBOLISM; 12/3/2023 3:00 pm   INDICATION: Signs/Symptoms:hypoxia, dyspnea.   COMPARISON: 11/28/2021   ACCESSION NUMBER(S): [id number]   ORDERING CLINICIAN: JESSENIA COVARRUBIAS   TECHNIQUE: Contiguous axial images of the thorax were obtained from the level of the thoracic inlet through the lung bases. 3-D MIPS were created, processed and reviewed on a separate workstation. 100 ml of Omnipaque 350 was utilized. All CT examinations are performed with 1 or more of the following dose reduction techniques: Automated exposure control, adjustment of mA and/or kv according to patient's size, or use of iterative reconstruction techniques.   FINDINGS: The thyroid gland is unremarkable.   There is adequate contrast opacification of the pulmonary arterial vasculature. No filling defect or vessel cutoff to indicate pulmonary embolus.   The heart size is within normal limits.  No pericardial effusion is identified. The aorta and pulmonary arteries are normal in caliber.   There are no pathologically enlarged mediastinal, hilar, or axillary lymph nodes are seen.   The trachea and mainstem bronchi are patent. Again seen are scattered chronic fibrotic changes consistent with chronic interstitial lung disease with scattered interlobular septal thickening, traction bronchiectasis, and scattered subpleural and patchy fibrosis, similar to the prior examination of 11/28. There is no evidence of pneumothorax or pleural effusion.   The visualized osseous structures are intact.   Limited images through the upper abdomen are unremarkable.       1. No pulmonary embolus. 2. Chronic parenchymal and interstitial  lung disease with scattered fibrosis and bronchiectasis, similar in appearance to the prior examination. No definitive acute consolidation.     Signed by: Antwan Zhang 12/3/2023 4:13 PM Dictation workstation:   [id number]    XR chest 1 view  Result Date: 12/3/2023  Interpreted By:  Antwan Zhang, STUDY: XR CHEST 1 VIEW; 12/3/2023 12:04 pm   INDICATION: Signs/Symptoms:dyspnea.   COMPARISON: 11/19/2023 and 03/23/2023   ACCESSION NUMBER(S): [id number]   ORDERING CLINICIAN: JESSENIA COVARRUBIAS   TECHNIQUE: 1 view of the chest was performed.   FINDINGS: The lungs are adequately inflated. There is a similar appearance of bilateral airspace opacities, greater in the right lung with prominent interstitial markings, most likely the sequela of chronic parenchymal and fibrotic changes. There is no significant interval change from the prior study, although it is difficult to completely exclude a superimposed infectious or inflammatory etiology given the extensive chronic parenchymal changes. Findings do not appear significantly changed. No pleural effusion. No pneumothorax.  The cardiomediastinal silhouette is within normal limits.   No significant interval change from 11/19/2023 and 03/23/2023. Diffuse interstitial coarsening and chronic appearing patchy airspace opacities, most likely due to chronic fibrosis. No new airspace opacities are seen.   Signed by: Antwan Zhang 12/3/2023 12:20 PM Dictation workstation:   [id number]        Impression  Acute bronchitis  Acute hypoxic respiratory failure  Interstitial lung disease  COPD  Ex tobacco use      Plan  Home oxygen desat study reviewed and patient qualifies for 3 L nasal cannula oxygen continuously  Continue IBD/ICS  Incentive spirometry/pulmonary hygiene  Prednisone-discharge on taper  Continue oral doxycycline for a 7 day total  FEV1 today shows a severe restrictive ventilatory defect-full PFTs as outpatient  Prophylaxis  PT/OT/out of bed  Stable for discharge  from a pulmonary standpoint  Follow-up with Dr. Brooks in 3 weeks        Priti Triana, APRN-CNP  [facility]

## 2023-12-06 NOTE — PROGRESS NOTES
Nutrition Progress Note    Screened for CHF Day 3. No Hx of CHF, no indication of acute CHF. Deferring assessment at this time.

## 2023-12-06 NOTE — CARE PLAN
Problem: Pain  Goal: My pain/discomfort is manageable  12/5/2023 2327 by Nichole Pineda RN  Outcome: Progressing  12/5/2023 2327 by Nichole Pineda RN  Outcome: Progressing     Problem: Safety  Goal: Patient will be injury free during hospitalization  12/5/2023 2327 by Nichole Pineda RN  Outcome: Progressing  12/5/2023 2327 by Nichole Pineda RN  Outcome: Progressing  Goal: I will remain free of falls  12/5/2023 2327 by Nichole Pineda RN  Outcome: Progressing  12/5/2023 2327 by Nichole Pineda RN  Outcome: Progressing     Problem: Daily Care  Goal: Daily care needs are met  12/5/2023 2327 by Nichole Pineda RN  Outcome: Progressing  12/5/2023 2327 by Nichole Pineda RN  Outcome: Progressing     Problem: Psychosocial Needs  Goal: Demonstrates ability to cope with hospitalization/illness  12/5/2023 2327 by Nichole Pineda RN  Outcome: Progressing  12/5/2023 2327 by Nichole Pineda RN  Outcome: Progressing  Goal: Collaborate with me, my family, and caregiver to identify my specific goals  12/5/2023 2327 by Nichole Pineda RN  Outcome: Progressing  12/5/2023 2327 by Nichole Pineda RN  Outcome: Progressing     Problem: Discharge Barriers  Goal: My discharge needs are met  12/5/2023 2327 by Nichole Pineda RN  Outcome: Progressing  12/5/2023 2327 by Nichole Pineda RN  Outcome: Progressing     Problem: Respiratory  Goal: Clear secretions with interventions this shift  12/5/2023 2327 by Nichole Pineda RN  Outcome: Progressing  12/5/2023 2327 by Nichole Pineda RN  Outcome: Progressing  Goal: Minimize anxiety/maximize coping throughout shift  12/5/2023 2327 by Nichole Pineda RN  Outcome: Progressing  12/5/2023 2327 by Nichole Pineda RN  Outcome: Progressing  Goal: Minimal/no exertional discomfort or dyspnea this shift  12/5/2023 2327 by Nichole Pineda RN  Outcome:  Progressing  12/5/2023 2327 by Nichole Pineda RN  Outcome: Progressing  Goal: No signs of respiratory distress (eg. Use of accessory muscles. Peds grunting)  12/5/2023 2327 by Nichole Pineda RN  Outcome: Progressing  12/5/2023 2327 by Nichole Pineda RN  Outcome: Progressing  Goal: Patent airway maintained this shift  12/5/2023 2327 by Nichole Pineda RN  Outcome: Progressing  12/5/2023 2327 by Nichole Pineda RN  Outcome: Progressing  Goal: Tolerate mechanical ventilation evidenced by VS/agitation level this shift  12/5/2023 2327 by Nichole Pineda RN  Outcome: Progressing  12/5/2023 2327 by Nichole Pineda RN  Outcome: Progressing  Goal: Tolerate pulmonary toileting this shift  12/5/2023 2327 by Nichole Pineda RN  Outcome: Progressing  12/5/2023 2327 by Nichole Pineda RN  Outcome: Progressing  Goal: Verbalize decreased shortness of breath this shift  12/5/2023 2327 by Nichole Pineda RN  Outcome: Progressing  12/5/2023 2327 by Nichole Pineda RN  Outcome: Progressing  Goal: Wean oxygen to maintain O2 saturation per order/standard this shift  12/5/2023 2327 by Nichole Pineda RN  Outcome: Progressing  12/5/2023 2327 by Nichole Pineda RN  Outcome: Progressing  Goal: Increase self care and/or family involvement in next 24 hours  12/5/2023 2327 by Nichole Pineda RN  Outcome: Progressing  12/5/2023 2327 by Nichole Pineda RN  Outcome: Progressing     Problem: Fall/Injury  Goal: Not fall by end of shift  12/5/2023 2327 by Nichole Pineda RN  Outcome: Progressing  12/5/2023 2327 by Nichole Pineda RN  Outcome: Progressing  Goal: Be free from injury by end of the shift  12/5/2023 2327 by Nichole Pineda RN  Outcome: Progressing  12/5/2023 2327 by Nichole Pineda, RN  Outcome: Progressing  Goal: Verbalize understanding of personal risk factors for fall in the  hospital  12/5/2023 2327 by Nichole Pineda RN  Outcome: Progressing  12/5/2023 2327 by Nichole Pineda RN  Outcome: Progressing  Goal: Verbalize understanding of risk factor reduction measures to prevent injury from fall in the home  12/5/2023 2327 by Nichole Pineda RN  Outcome: Progressing  12/5/2023 2327 by Nichole Pineda RN  Outcome: Progressing  Goal: Use assistive devices by end of the shift  12/5/2023 2327 by Nichole Pineda RN  Outcome: Progressing  12/5/2023 2327 by Nichole Pineda RN  Outcome: Progressing  Goal: Pace activities to prevent fatigue by end of the shift  12/5/2023 2327 by Nichole Pineda RN  Outcome: Progressing  12/5/2023 2327 by Nichole Pineda RN  Outcome: Progressing     Problem: Skin  Goal: Decreased wound size/increased tissue granulation at next dressing change  12/5/2023 2327 by Nichole Pineda RN  Outcome: Progressing  Flowsheets (Taken 12/5/2023 2000)  Decreased wound size/increased tissue granulation at next dressing change:   Promote sleep for wound healing   Protective dressings over bony prominences  12/5/2023 2327 by Nichole Pineda RN  Outcome: Progressing  Flowsheets (Taken 12/5/2023 2000)  Decreased wound size/increased tissue granulation at next dressing change:   Promote sleep for wound healing   Protective dressings over bony prominences  Goal: Participates in plan/prevention/treatment measures  12/5/2023 2327 by Nichole Pineda RN  Outcome: Progressing  Flowsheets (Taken 12/5/2023 2000)  Participates in plan/prevention/treatment measures:   Discuss with provider PT/OT consult   Increase activity/out of bed for meals  12/5/2023 2327 by Nichole Pineda RN  Outcome: Progressing  Flowsheets (Taken 12/5/2023 2000)  Participates in plan/prevention/treatment measures:   Discuss with provider PT/OT consult   Increase activity/out of bed for meals  Goal: Prevent/manage excess  moisture  12/5/2023 2327 by Nichole Pineda RN  Outcome: Progressing  Flowsheets (Taken 12/5/2023 2000)  Prevent/manage excess moisture:   Cleanse incontinence/protect with barrier cream   Moisturize dry skin  12/5/2023 2327 by Nichole Pineda RN  Outcome: Progressing  Flowsheets (Taken 12/5/2023 2000)  Prevent/manage excess moisture:   Cleanse incontinence/protect with barrier cream   Moisturize dry skin  Goal: Prevent/minimize sheer/friction injuries  12/5/2023 2327 by Nichole Pineda RN  Outcome: Progressing  Flowsheets (Taken 12/5/2023 2000)  Prevent/minimize sheer/friction injuries:   Increase activity/out of bed for meals   Turn/reposition every 2 hours/use positioning/transfer devices  12/5/2023 2327 by Nichole Pineda RN  Outcome: Progressing  Flowsheets (Taken 12/5/2023 2000)  Prevent/minimize sheer/friction injuries:   Increase activity/out of bed for meals   Turn/reposition every 2 hours/use positioning/transfer devices  Goal: Promote/optimize nutrition  12/5/2023 2327 by Nichole Pineda RN  Outcome: Progressing  Flowsheets (Taken 12/5/2023 2000)  Promote/optimize nutrition:   Monitor/record intake including meals   Offer water/supplements/favorite foods  12/5/2023 2327 by Nichole Pineda RN  Outcome: Progressing  Flowsheets (Taken 12/5/2023 2000)  Promote/optimize nutrition:   Monitor/record intake including meals   Offer water/supplements/favorite foods  Goal: Promote skin healing  12/5/2023 2327 by Nichole Pineda RN  Outcome: Progressing  Flowsheets (Taken 12/5/2023 2000)  Promote skin healing:   Assess skin/pad under line(s)/device(s)   Protective dressings over bony prominences   Turn/reposition every 2 hours/use positioning/transfer devices  12/5/2023 2327 by Nichole Pineda RN  Outcome: Progressing  Flowsheets (Taken 12/5/2023 2000)  Promote skin healing:   Assess skin/pad under line(s)/device(s)   Protective dressings over bony  prominences   Turn/reposition every 2 hours/use positioning/transfer devices   The patient's goals for the shift include Sleep    The clinical goals for the shift include decrease oxygen    Over the shift, the patient did not make progress toward the following goals. Barriers to progression include . Recommendations to address these barriers include .

## 2023-12-06 NOTE — CARE PLAN
Problem: Pain  Goal: My pain/discomfort is manageable  Outcome: Progressing     Problem: Safety  Goal: Patient will be injury free during hospitalization  Outcome: Progressing  Goal: I will remain free of falls  Outcome: Progressing     Problem: Daily Care  Goal: Daily care needs are met  Outcome: Progressing     Problem: Psychosocial Needs  Goal: Demonstrates ability to cope with hospitalization/illness  Outcome: Progressing  Goal: Collaborate with me, my family, and caregiver to identify my specific goals  Outcome: Progressing     Problem: Discharge Barriers  Goal: My discharge needs are met  Outcome: Progressing     Problem: Respiratory  Goal: Clear secretions with interventions this shift  Outcome: Progressing  Goal: Minimize anxiety/maximize coping throughout shift  Outcome: Progressing  Goal: Minimal/no exertional discomfort or dyspnea this shift  Outcome: Progressing  Goal: No signs of respiratory distress (eg. Use of accessory muscles. Peds grunting)  Outcome: Progressing  Goal: Patent airway maintained this shift  Outcome: Progressing  Goal: Tolerate mechanical ventilation evidenced by VS/agitation level this shift  Outcome: Progressing  Goal: Tolerate pulmonary toileting this shift  Outcome: Progressing  Goal: Verbalize decreased shortness of breath this shift  Outcome: Progressing  Goal: Wean oxygen to maintain O2 saturation per order/standard this shift  Outcome: Progressing  Goal: Increase self care and/or family involvement in next 24 hours  Outcome: Progressing     Problem: Fall/Injury  Goal: Not fall by end of shift  Outcome: Progressing  Goal: Be free from injury by end of the shift  Outcome: Progressing  Goal: Verbalize understanding of personal risk factors for fall in the hospital  Outcome: Progressing  Goal: Verbalize understanding of risk factor reduction measures to prevent injury from fall in the home  Outcome: Progressing  Goal: Use assistive devices by end of the shift  Outcome:  Progressing  Goal: Pace activities to prevent fatigue by end of the shift  Outcome: Progressing     Problem: Skin  Goal: Decreased wound size/increased tissue granulation at next dressing change  12/6/2023 1452 by Molly Andrade RN  Flowsheets (Taken 12/6/2023 1452)  Decreased wound size/increased tissue granulation at next dressing change: Promote sleep for wound healing  12/6/2023 1450 by Molly Andrade RN  Outcome: Progressing  12/6/2023 1449 by Molly Andrade RN  Outcome: Progressing  Goal: Participates in plan/prevention/treatment measures  12/6/2023 1452 by Molly Andrade RN  Flowsheets (Taken 12/6/2023 1452)  Participates in plan/prevention/treatment measures:   Discuss with provider PT/OT consult   Increase activity/out of bed for meals  12/6/2023 1450 by Molly Andrade RN  Outcome: Progressing  12/6/2023 1449 by Molly Andrade RN  Outcome: Progressing  Goal: Prevent/manage excess moisture  12/6/2023 1452 by Molly Andrade RN  Flowsheets (Taken 12/6/2023 1452)  Prevent/manage excess moisture:   Moisturize dry skin   Monitor for/manage infection if present  12/6/2023 1450 by Molly Andrade RN  Outcome: Progressing  12/6/2023 1449 by Molly Andrade RN  Outcome: Progressing  Goal: Prevent/minimize sheer/friction injuries  12/6/2023 1452 by Molly Andrade RN  Flowsheets (Taken 12/6/2023 1452)  Prevent/minimize sheer/friction injuries:   HOB 30 degrees or less   Increase activity/out of bed for meals   Use pull sheet   Turn/reposition every 2 hours/use positioning/transfer devices  12/6/2023 1450 by Molly Andrade RN  Outcome: Progressing  12/6/2023 1449 by Molly Andrade RN  Outcome: Progressing  Goal: Promote/optimize nutrition  12/6/2023 1452 by Molly Andrade RN  Flowsheets (Taken 12/6/2023 1452)  Promote/optimize nutrition:   Assist with feeding   Monitor/record intake including meals    Discuss with provider if NPO > 2 days  12/6/2023 1450 by Molly Andrade RN  Outcome: Progressing  12/6/2023 1449 by Molly Andrade RN  Outcome: Progressing  Goal: Promote skin healing  12/6/2023 1452 by Molly Andrade RN  Flowsheets (Taken 12/6/2023 1452)  Promote skin healing: Turn/reposition every 2 hours/use positioning/transfer devices  12/6/2023 1450 by Molly Andrade RN  Outcome: Progressing  12/6/2023 1449 by Molly Andrade RN  Outcome: Progressing   The patient's goals for the shift include Sleep    The clinical goals for the shift include decrease oxygen

## 2023-12-06 NOTE — CARE PLAN
Problem: Pain  Goal: My pain/discomfort is manageable  Outcome: Progressing     Problem: Safety  Goal: Patient will be injury free during hospitalization  Outcome: Progressing  Goal: I will remain free of falls  Outcome: Progressing     Problem: Daily Care  Goal: Daily care needs are met  Outcome: Progressing     Problem: Psychosocial Needs  Goal: Demonstrates ability to cope with hospitalization/illness  Outcome: Progressing  Goal: Collaborate with me, my family, and caregiver to identify my specific goals  Outcome: Progressing     Problem: Discharge Barriers  Goal: My discharge needs are met  Outcome: Progressing     Problem: Respiratory  Goal: Clear secretions with interventions this shift  Outcome: Progressing  Goal: Minimize anxiety/maximize coping throughout shift  Outcome: Progressing  Goal: Minimal/no exertional discomfort or dyspnea this shift  Outcome: Progressing  Goal: No signs of respiratory distress (eg. Use of accessory muscles. Peds grunting)  Outcome: Progressing  Goal: Patent airway maintained this shift  Outcome: Progressing  Goal: Tolerate mechanical ventilation evidenced by VS/agitation level this shift  Outcome: Progressing  Goal: Tolerate pulmonary toileting this shift  Outcome: Progressing  Goal: Verbalize decreased shortness of breath this shift  Outcome: Progressing  Goal: Wean oxygen to maintain O2 saturation per order/standard this shift  Outcome: Progressing  Goal: Increase self care and/or family involvement in next 24 hours  Outcome: Progressing     Problem: Fall/Injury  Goal: Not fall by end of shift  Outcome: Progressing  Goal: Be free from injury by end of the shift  Outcome: Progressing  Goal: Verbalize understanding of personal risk factors for fall in the hospital  Outcome: Progressing  Goal: Verbalize understanding of risk factor reduction measures to prevent injury from fall in the home  Outcome: Progressing  Goal: Use assistive devices by end of the shift  Outcome:  Progressing  Goal: Pace activities to prevent fatigue by end of the shift  Outcome: Progressing     Problem: Skin  Goal: Decreased wound size/increased tissue granulation at next dressing change  Outcome: Progressing  Goal: Participates in plan/prevention/treatment measures  Outcome: Progressing  Goal: Prevent/manage excess moisture  Outcome: Progressing  Goal: Prevent/minimize sheer/friction injuries  Outcome: Progressing  Goal: Promote/optimize nutrition  Outcome: Progressing  Goal: Promote skin healing  Outcome: Progressing   The patient's goals for the shift include Sleep    The clinical goals for the shift include decrease oxygen    Over the shift, the patient did not make progress toward the following goals. Barriers to progression include . Recommendations to address these barriers include .

## 2023-12-06 NOTE — PROGRESS NOTES
Physical Therapy    Physical Therapy Treatment    Patient Name: Melanie Hodges  MRN: [id number]  Today's Date: 12/6/2023  Time Calculation  Start Time: 1400  Stop Time: 1415  Time Calculation (min): 15 min       Assessment/Plan   PT Assessment  End of Session Communication: Bedside nurse  Assessment Comment: pt requiring min to mod assist for the above limited mobility; pt functioning below baseline; + increased fall risk;  End of Session Patient Position: Bed, 3 rail up (son present)  PT Plan  Inpatient/Swing Bed or Outpatient: Inpatient  PT Plan  Treatment/Interventions: Bed mobility, Transfer training, Balance training, Strengthening, Endurance training, Therapeutic exercise, Therapeutic activity, Wheelchair management  PT Plan: Skilled PT  PT Frequency: 4 times per week  PT Discharge Recommendations: Moderate intensity level of continued care (spoke with  pt that if she refuses mod int rehab, then she will require increased HHA  hands on assistance and length of time at home for safe mobility.)  Equipment Recommended upon Discharge: Wheelchair  PT Recommended Transfer Status: Assist x1  PT - OK to Discharge: Yes      General Visit Information:   PT  Visit  PT Received On: 12/06/23  General  Family/Caregiver Present: Yes (son)  Prior to Session Communication: Bedside nurse  Patient Position Received:  (in w/c)  General Comment: cleared by nurse for therapy; pt seen in w/c coming back from PFT's with transporter, on 3 liters o2; pt agreeable to therapy    Subjective   Precautions:  Precautions  Medical Precautions: Fall precautions, Oxygen therapy device and L/min (3 liters o2)  Vital Signs:  Vital Signs  Heart Rate: (!) 119  SpO2: 96 %  Patient Position: Sitting    Objective   Pain:  Pain Assessment  Pain Assessment: 0-10  Pain Score: 6  Pain Type: Acute pain  Pain Location: Head  Pain Orientation: Anterior (HA)  Pain Interventions:  (alerted nurse to pt's request for pain meds due to  "HA)  Cognition:  Cognition  Overall Cognitive Status: Within Functional Limits  Postural Control:     Extremity/Trunk Assessments:    Activity Tolerance:     Treatments:  Therapeutic Activity  Therapeutic Activity Performed: Yes (see transfers, bed mobility)    Bed Mobility  Bed Mobility: Yes  Bed Mobility 1  Bed Mobility 1: Sitting to supine  Level of Assistance 1: Minimum assistance  Bed Mobility Comments 1: sit to supine with min assist of 1 for bilateral LE's onto bed, head of bed elevated 35 degrees    Ambulation/Gait Training  Ambulation/Gait Training Performed: Yes  Ambulation/Gait Training 1  Surface 1: Level tile  Device 1: Rolling walker  Assistance 1: Moderate assistance, Moderate verbal cues  Comments/Distance (ft) 1: pt sstanding with FWW after transfer from w/c, pt refused to attempt amb with FWW stating, \" I can't do this. This is different from my house\" and pt returned to sititng in w/c  Transfers  Transfer: Yes  Transfer 1  Transfer From 1: Sit to  Transfer to 1: Stand  Technique 1: Sit to stand  Transfer Level of Assistance 1: Minimum assistance, Minimal verbal cues  Trials/Comments 1: sit to stand with min assist of 1 for trunk up. verbal cues for proper bilateral hand placement  Transfers 2  Transfer From 2: Stand to  Transfer to 2: Sit  Technique 2: Stand to sit  Transfer Level of Assistance 2: Minimum assistance, Minimal verbal cues  Trials/Comments 2: stand to sit with min assist of 1, verbal cues for reaching back with both hands on bed prior to attempting to sit  Transfers 3  Transfer From 3: Wheelchair to  Transfer to 3: Bed  Technique 3: Stand pivot  Transfer Level of Assistance 3: Moderate assistance, Minimal verbal cues  Trials/Comments 3: stand pivot transfer w/c to bed with mod assist of 1, verbal cues for proper bilateral hand placement throughout transfer with 2-3 small shuffling steps and pivoting last 50% of transfer    Other Activity  Other Activity Performed:  (seated scooting " on edge of bed with verbal cues for proper bilateral hand placement and forward weight shifting to clear buttocks from bed)    Outcome Measures:  [facility] Basic Mobility  Turning from your back to your side while in a flat bed without using bedrails: A little  Moving from lying on your back to sitting on the side of a flat bed without using bedrails: A little  Moving to and from bed to chair (including a wheelchair): A lot  Standing up from a chair using your arms (e.g. wheelchair or bedside chair): A lot  To walk in hospital room: Total  Climbing 3-5 steps with railing: Total  Basic Mobility - Total Score: 12    Education Documentation  No documentation found.  Education Comments  No comments found.        OP EDUCATION:       Encounter Problems       Encounter Problems (Active)       Balance       LTG - Patient will maintain balance to allow for safe mobility during transfers (Progressing)       Start:  12/04/23    Expected End:  12/31/23               Mobility       LTG - Patient will propel wheelchair household distances negotiating obstacles safely with close supervision. (Progressing)       Start:  12/04/23    Expected End:  12/31/23               Safety       LTG - Patient will demonstrate safety requirements appropriate to situation/environment with close supervision (Progressing)       Start:  12/04/23    Expected End:  12/31/23               Transfers       LTG - Patient will transfer to and from wheelchair to bed with close supervision. (Progressing)       Start:  12/04/23    Expected End:  12/31/23            STG - Patient will transfer sit to and from stand with close supervision. (Progressing)       Start:  12/04/23    Expected End:  12/31/23

## 2023-12-06 NOTE — CARE PLAN
The patient's goals for the shift include Sleep    The clinical goals for the shift include decrease oxygen      Problem: Pain  Goal: My pain/discomfort is manageable  Outcome: Progressing     Problem: Safety  Goal: Patient will be injury free during hospitalization  Outcome: Progressing  Goal: I will remain free of falls  Outcome: Progressing     Problem: Daily Care  Goal: Daily care needs are met  Outcome: Progressing     Problem: Psychosocial Needs  Goal: Demonstrates ability to cope with hospitalization/illness  Outcome: Progressing  Goal: Collaborate with me, my family, and caregiver to identify my specific goals  Outcome: Progressing     Problem: Discharge Barriers  Goal: My discharge needs are met  Outcome: Progressing     Problem: Respiratory  Goal: Clear secretions with interventions this shift  Outcome: Progressing  Goal: Minimize anxiety/maximize coping throughout shift  Outcome: Progressing  Goal: Minimal/no exertional discomfort or dyspnea this shift  Outcome: Progressing  Goal: No signs of respiratory distress (eg. Use of accessory muscles. Peds grunting)  Outcome: Progressing  Goal: Patent airway maintained this shift  Outcome: Progressing  Goal: Tolerate mechanical ventilation evidenced by VS/agitation level this shift  Outcome: Progressing  Goal: Tolerate pulmonary toileting this shift  Outcome: Progressing  Goal: Verbalize decreased shortness of breath this shift  Outcome: Progressing  Goal: Wean oxygen to maintain O2 saturation per order/standard this shift  Outcome: Progressing  Goal: Increase self care and/or family involvement in next 24 hours  Outcome: Progressing     Problem: Fall/Injury  Goal: Not fall by end of shift  Outcome: Progressing  Goal: Be free from injury by end of the shift  Outcome: Progressing  Goal: Verbalize understanding of personal risk factors for fall in the hospital  Outcome: Progressing  Goal: Verbalize understanding of risk factor reduction measures to prevent injury  from fall in the home  Outcome: Progressing  Goal: Use assistive devices by end of the shift  Outcome: Progressing  Goal: Pace activities to prevent fatigue by end of the shift  Outcome: Progressing     Problem: Skin  Goal: Decreased wound size/increased tissue granulation at next dressing change  Outcome: Progressing  Goal: Participates in plan/prevention/treatment measures  Outcome: Progressing  Goal: Prevent/manage excess moisture  Outcome: Progressing  Goal: Prevent/minimize sheer/friction injuries  Outcome: Progressing  Goal: Promote/optimize nutrition  Outcome: Progressing  Goal: Promote skin healing  Outcome: Progressing

## 2023-12-06 NOTE — CARE PLAN
Problem: Respiratory  Goal: Clear secretions with interventions this shift  Outcome: Progressing  Goal: Minimize anxiety/maximize coping throughout shift  Outcome: Progressing  Goal: Minimal/no exertional discomfort or dyspnea this shift  Outcome: Progressing  Goal: No signs of respiratory distress (eg. Use of accessory muscles. Peds grunting)  Outcome: Progressing  Goal: Patent airway maintained this shift  Outcome: Progressing  Goal: Tolerate mechanical ventilation evidenced by VS/agitation level this shift  Outcome: Progressing  Goal: Tolerate pulmonary toileting this shift  Outcome: Progressing  Goal: Verbalize decreased shortness of breath this shift  Outcome: Progressing  Goal: Wean oxygen to maintain O2 saturation per order/standard this shift  Outcome: Progressing  Goal: Increase self care and/or family involvement in next 24 hours  Outcome: Progressing

## 2023-12-06 NOTE — CARE PLAN
Problem: Pain  Goal: My pain/discomfort is manageable  Outcome: Progressing     Problem: Safety  Goal: Patient will be injury free during hospitalization  Outcome: Progressing  Goal: I will remain free of falls  Outcome: Progressing     Problem: Daily Care  Goal: Daily care needs are met  Outcome: Progressing     Problem: Psychosocial Needs  Goal: Demonstrates ability to cope with hospitalization/illness  Outcome: Progressing  Goal: Collaborate with me, my family, and caregiver to identify my specific goals  Outcome: Progressing     Problem: Discharge Barriers  Goal: My discharge needs are met  Outcome: Progressing     Problem: Respiratory  Goal: Clear secretions with interventions this shift  Outcome: Progressing  Goal: Minimize anxiety/maximize coping throughout shift  Outcome: Progressing  Goal: Minimal/no exertional discomfort or dyspnea this shift  Outcome: Progressing  Goal: No signs of respiratory distress (eg. Use of accessory muscles. Peds grunting)  Outcome: Progressing  Goal: Patent airway maintained this shift  Outcome: Progressing  Goal: Tolerate mechanical ventilation evidenced by VS/agitation level this shift  Outcome: Progressing  Goal: Tolerate pulmonary toileting this shift  Outcome: Progressing  Goal: Verbalize decreased shortness of breath this shift  Outcome: Progressing  Goal: Wean oxygen to maintain O2 saturation per order/standard this shift  Outcome: Progressing  Goal: Increase self care and/or family involvement in next 24 hours  Outcome: Progressing     Problem: Fall/Injury  Goal: Not fall by end of shift  Outcome: Progressing  Goal: Be free from injury by end of the shift  Outcome: Progressing  Goal: Verbalize understanding of personal risk factors for fall in the hospital  Outcome: Progressing  Goal: Verbalize understanding of risk factor reduction measures to prevent injury from fall in the home  Outcome: Progressing  Goal: Use assistive devices by end of the shift  Outcome:  Progressing  Goal: Pace activities to prevent fatigue by end of the shift  Outcome: Progressing     Problem: Skin  Goal: Decreased wound size/increased tissue granulation at next dressing change  12/6/2023 1450 by Molly Andrade, RN  Outcome: Progressing  12/6/2023 1449 by Molly Andrade, RN  Outcome: Progressing  Goal: Participates in plan/prevention/treatment measures  12/6/2023 1450 by Molly Andrade, RN  Outcome: Progressing  12/6/2023 1449 by Molly Andrade, RN  Outcome: Progressing  Goal: Prevent/manage excess moisture  12/6/2023 1450 by Molly Andrade, RN  Outcome: Progressing  12/6/2023 1449 by Molly Andrade, RN  Outcome: Progressing  Goal: Prevent/minimize sheer/friction injuries  12/6/2023 1450 by Molly Andrade, RN  Outcome: Progressing  12/6/2023 1449 by Molly Andrade, RN  Outcome: Progressing  Goal: Promote/optimize nutrition  12/6/2023 1450 by Molly Andrade, RN  Outcome: Progressing  12/6/2023 1449 by Molly Andrade, RN  Outcome: Progressing  Goal: Promote skin healing  12/6/2023 1450 by Molly Andrade, RN  Outcome: Progressing  12/6/2023 1449 by Molly Andrade RN  Outcome: Progressing   The patient's goals for the shift include Sleep    The clinical goals for the shift include decrease oxygen

## 2023-12-07 LAB
BACTERIA BLD CULT: NORMAL
BACTERIA BLD CULT: NORMAL

## 2023-12-11 ENCOUNTER — PATIENT OUTREACH (OUTPATIENT)
Dept: CARE COORDINATION | Facility: CLINIC | Age: 73
End: 2023-12-11
Payer: COMMERCIAL